# Patient Record
Sex: MALE | Race: WHITE | NOT HISPANIC OR LATINO | Employment: UNEMPLOYED | URBAN - METROPOLITAN AREA
[De-identification: names, ages, dates, MRNs, and addresses within clinical notes are randomized per-mention and may not be internally consistent; named-entity substitution may affect disease eponyms.]

---

## 2019-07-30 DIAGNOSIS — Z12.11 COLON CANCER SCREENING: ICD-10-CM

## 2021-03-03 DIAGNOSIS — Z23 NEED FOR VACCINATION: ICD-10-CM

## 2021-05-28 ENCOUNTER — HOSPITAL ENCOUNTER (OUTPATIENT)
Dept: PULMONOLOGY | Facility: MEDICAL CENTER | Age: 66
End: 2021-05-28
Attending: STUDENT IN AN ORGANIZED HEALTH CARE EDUCATION/TRAINING PROGRAM
Payer: MEDICARE

## 2021-05-28 PROCEDURE — 94729 DIFFUSING CAPACITY: CPT

## 2021-05-28 PROCEDURE — 94060 EVALUATION OF WHEEZING: CPT

## 2021-05-28 PROCEDURE — 94726 PLETHYSMOGRAPHY LUNG VOLUMES: CPT

## 2021-05-28 RX ORDER — ALBUTEROL SULFATE 90 UG/1
2 AEROSOL, METERED RESPIRATORY (INHALATION)
Status: DISCONTINUED | OUTPATIENT
Start: 2021-05-28 | End: 2021-05-29 | Stop reason: HOSPADM

## 2021-05-28 ASSESSMENT — PULMONARY FUNCTION TESTS
FEV1/FVC_PERCENT_PREDICTED: 72
FEV1/FVC_PREDICTED: 76.90
FEV1: 2.34
FEV1/FVC_PERCENT_CHANGE: 33
FEV1_LLN: 2.75
FEV1_PERCENT_CHANGE: 3
FVC_PERCENT_PREDICTED: 90
FEV1/FVC_PERCENT_PREDICTED: 76
FEV1_LLN: 2.76
FVC_PREDICTED: 4.29
FEV1/FVC: 59.59
FVC: 3.87
FEV1_PERCENT_PREDICTED: 68
FVC: 4.22
FEV1/FVC_PERCENT_LLN: 64.21
FVC_PERCENT_PREDICTED: 98
FVC_LLN: 3.58
FEV1_PERCENT_CHANGE: 9
FVC_LLN: 3.58
FEV1/FVC: 55.45
FEV1/FVC_PERCENT_CHANGE: -5
FEV1: .26
FEV1/FVC: 7
FEV1_PREDICTED: 3.29
FEV1/FVC_PERCENT_PREDICTED: 72
FEV1/FVC: 55.47
FEV1/FVC_PERCENT_PREDICTED: 77
FEV1/FVC_PERCENT_PREDICTED: 76
FEV1/FVC_PERCENT_LLN: 64.21
FEV1_PERCENT_PREDICTED: 71

## 2021-05-31 NOTE — PROCEDURES
DATE OF SERVICE:  05/28/2021     PULMONARY FUNCTION TEST INTERPRETATION     REQUESTING PROVIDER:  Lance Collazo MD     REASON FOR REQUEST:  COPD.     INTERPRETATION:  1.  Acceptable and reproducible.  2.  FEV1 2.26 liters (68%), FVC 3.87 liters (90%), FEV1/FVC 58.59%.  3.  Flow volume loops consistent with peripheral air obstruction.  4.  TLC 7.69 liters (112%).  5.  DLCO 20.17 mL per mmHg (17%).     IMPRESSION:  Moderate obstruction with no response to bronchodilator, evidence   of air trapping and mild reduction in gas transfer consistent with patient's   diagnosis of asthma.        ______________________________  MD ROSE Perkins/MARCO/NOHEMI    DD:  05/30/2021 16:48  DT:  05/30/2021 17:37    Job#:  [id number]    CC:Lance Collazo MD

## 2021-06-01 PROCEDURE — 94729 DIFFUSING CAPACITY: CPT | Mod: 26 | Performed by: INTERNAL MEDICINE

## 2021-06-01 PROCEDURE — 94726 PLETHYSMOGRAPHY LUNG VOLUMES: CPT | Mod: 26 | Performed by: INTERNAL MEDICINE

## 2021-06-01 PROCEDURE — 94060 EVALUATION OF WHEEZING: CPT | Mod: 26 | Performed by: INTERNAL MEDICINE

## 2021-10-06 ENCOUNTER — OFFICE VISIT (OUTPATIENT)
Dept: INTERNAL MEDICINE | Facility: OTHER | Age: 66
End: 2021-10-06
Payer: COMMERCIAL

## 2021-10-06 VITALS
DIASTOLIC BLOOD PRESSURE: 72 MMHG | HEART RATE: 94 BPM | OXYGEN SATURATION: 98 % | BODY MASS INDEX: 37.66 KG/M2 | WEIGHT: 255 LBS | SYSTOLIC BLOOD PRESSURE: 128 MMHG | TEMPERATURE: 100 F

## 2021-10-06 DIAGNOSIS — R94.39 ABNORMAL STRESS TEST: ICD-10-CM

## 2021-10-06 DIAGNOSIS — F17.200 SMOKING: ICD-10-CM

## 2021-10-06 DIAGNOSIS — I73.9 PERIPHERAL ARTERIAL DISEASE (HCC): ICD-10-CM

## 2021-10-06 PROBLEM — B18.2 HEPATITIS C, CHRONIC (HCC): Status: ACTIVE | Noted: 2020-02-10

## 2021-10-06 PROBLEM — N18.30 STAGE 3 CHRONIC KIDNEY DISEASE: Status: ACTIVE | Noted: 2021-02-12

## 2021-10-06 PROBLEM — E78.5 DYSLIPIDEMIA: Status: ACTIVE | Noted: 2020-01-15

## 2021-10-06 PROBLEM — M54.30 SCIATICA: Status: ACTIVE | Noted: 2019-07-16

## 2021-10-06 PROBLEM — G47.33 OBSTRUCTIVE SLEEP APNEA SYNDROME IN ADULT: Status: ACTIVE | Noted: 2020-01-28

## 2021-10-06 PROBLEM — J44.9 CHRONIC OBSTRUCTIVE PULMONARY DISEASE (HCC): Status: ACTIVE | Noted: 2019-07-16

## 2021-10-06 PROBLEM — I73.00 RAYNAUDS SYNDROME: Status: ACTIVE | Noted: 2020-03-17

## 2021-10-06 PROBLEM — I50.9 HEART FAILURE (HCC): Status: ACTIVE | Noted: 2020-01-14

## 2021-10-06 PROCEDURE — 99214 OFFICE O/P EST MOD 30 MIN: CPT | Mod: GC | Performed by: INTERNAL MEDICINE

## 2021-10-06 RX ORDER — NICOTINE 21 MG/24HR
1 PATCH, TRANSDERMAL 24 HOURS TRANSDERMAL EVERY 24 HOURS
COMMUNITY
End: 2022-07-13 | Stop reason: SDUPTHER

## 2021-10-06 ASSESSMENT — LIFESTYLE VARIABLES: SUBSTANCE_ABUSE: 0

## 2021-10-06 ASSESSMENT — ENCOUNTER SYMPTOMS
HEADACHES: 0
FEVER: 0
PALPITATIONS: 0
SHORTNESS OF BREATH: 1
CHILLS: 0
BLURRED VISION: 0
CONSTIPATION: 0
ABDOMINAL PAIN: 0
WEAKNESS: 0
DEPRESSION: 0
COUGH: 1
DOUBLE VISION: 0
NAUSEA: 0
MYALGIAS: 0
VOMITING: 0
DIARRHEA: 0

## 2021-10-06 NOTE — PATIENT INSTRUCTIONS
- Get labs prior to next appt  - Take ASA daily and start Guaifenesin - would recommend taking as needed for 2 weeks but okay to continue for longer if needed  - F/u in 5 weeks for PAD

## 2021-10-06 NOTE — ASSESSMENT & PLAN NOTE
- Complains of cold sensation, numbness in BL feet  - When getting angiogram done, was told he had poor pulses in his feet  - Recommended to start ASA daily    - If no improvement in symptoms in next appt, can consider referral to Vascular.

## 2021-10-06 NOTE — PROGRESS NOTES
Established Patient    Pee Antoine is a 66  YOM with PMHx of gout, asthma, CKD III, HFpEF (50-55% 07/2020), HLD, HCV (s/p Mavyret completion) presenting today to the clinic for follow-up on his chronic issues    CC: F/u for labs    HPI:      He had a stress test done in July that showed some inconclusive results. As a result, he had an angiogram done last month - showed no significant occlusion that did not require stent placement. He is unsure of what meds he's on but knows he's on Spironolactone. He was told to get lab work done after that, along with a CXR. Lab work was done at [facility] - will get results.        He also complains of a cough for 8-9 months that has progressively getting worse. It happens both during day and night and is productive (yellow and green). The coughing at night is keeping him up. He was previously on Lisinopril but that has been discontinued since the angiogram.        He also complains of numbness and intermittent cold sensations in his feet. He was told in the past that he might have poor blood flow in his L leg.        He is currently on the patch for smoking cessation - has been on it since July. He admits to not using it everyday and occasional using cigarettes in between (when not on patch).       Review of Systems   Constitutional: Negative for chills and fever.   HENT: Negative for hearing loss and tinnitus.    Eyes: Negative for blurred vision and double vision.   Respiratory: Positive for cough (chronic, productive) and shortness of breath (chronic).    Cardiovascular: Positive for chest pain (occasionally) and leg swelling (occasionally). Negative for palpitations.   Gastrointestinal: Negative for abdominal pain, constipation, diarrhea, nausea and vomiting.   Musculoskeletal: Negative for joint pain and myalgias.   Neurological: Negative for weakness and headaches.   Psychiatric/Behavioral: Negative for depression and substance abuse.       Patient Active Problem  List    Diagnosis Date Noted   • Abnormal stress test 10/06/2021   • Peripheral arterial disease (HCC) 10/06/2021   • Stage 3 chronic kidney disease (HCC) 02/12/2021   • Raynauds syndrome 03/17/2020   • Hepatitis C, chronic (HCC) 02/10/2020   • Obstructive sleep apnea syndrome in adult 01/28/2020   • Dyslipidemia 01/15/2020   • Heart failure (HCC) 01/14/2020   • Smoking 01/14/2020   • Chronic obstructive pulmonary disease (HCC) 07/16/2019   • Sciatica 07/16/2019   • Renal cyst 05/22/2013   • Hepatitis C antibody test positive 05/22/2013   • Gout, chronic 03/18/2013   • Tobacco dependency 03/18/2013   • Circulatory disorder 03/18/2013   • Cough 03/18/2013   • Urinary hesitancy 03/18/2013       Social History     Tobacco Use   • Smoking status: Current Every Day Smoker   Substance Use Topics   • Alcohol use: Not on file   • Drug use: No       Current Outpatient Medications   Medication Sig Dispense Refill   • nicotine (NICODERM) 14 MG/24HR PATCH 24 HR Place 1 Patch on the skin every 24 hours.     • metoprolol tartrate (LOPRESSOR) 25 MG Tab Take 25 mg by mouth 2 times a day.     • mowitpu-mrhvvnjypa-lem (ROBITUSSIN CF) 30- MG/5ML Liquid Take 10 mL by mouth every four hours as needed for Cough for up to 30 days. 120 mL 1   • oxycodone, immediate release, (ROXICODONE) 5 MG TABS Take 1 Tab by mouth every 6 hours as needed for Mild Pain. 60 Each 0   • vitamin D, Ergocalciferol, (DRISDOL) 93711 UNITS CAPS capsule Take 1 Cap by mouth every 30 days. 1 Cap 0   • cyclobenzaprine (FLEXERIL) 10 MG TABS Take 1 Tab by mouth 3 times a day as needed for Mild Pain. 30 Tab 0   • gabapentin (NEURONTIN) 300 MG CAPS Take 1 Cap by mouth 3 times a day. 90 Cap 3   • allopurinol (ZYLOPRIM) 100 MG TABS Take 1 Tab by mouth every day. 30 Tab 3   • albuterol (VENTOLIN OR PROVENTIL) 108 (90 BASE) MCG/ACT AERS Inhale 2 Puffs by mouth every 6 hours as needed for Shortness of Breath. 8.5 g 3   • aspirin EC (ECOTRIN) 81 MG TBEC Take 1 Tab by  mouth every day. 30 Tab 6   • tiotropium (SPIRIVA) 18 MCG CAPS Inhale 1 Cap by mouth every day. 30 Cap 3     No current facility-administered medications for this visit.       /72   Pulse 94   Temp 37.8 °C (100 °F)   Wt 116 kg (255 lb)   SpO2 98%   BMI 37.66 kg/m²     PHYSICAL EXAM:  Physical Exam  Constitutional:       General: He is not in acute distress.     Appearance: Normal appearance. He is obese. He is not ill-appearing.   HENT:      Head: Normocephalic and atraumatic.      Mouth/Throat:      Mouth: Mucous membranes are moist.   Eyes:      Extraocular Movements: Extraocular movements intact.      Conjunctiva/sclera: Conjunctivae normal.   Cardiovascular:      Rate and Rhythm: Normal rate and regular rhythm.      Heart sounds: Normal heart sounds. No murmur heard.   No friction rub. No gallop.       Comments: Normal pulse in RLE, diminished pulse in LLE  Pulmonary:      Effort: Pulmonary effort is normal. No respiratory distress.      Breath sounds: Normal breath sounds. No wheezing or rales.   Musculoskeletal:      Right lower leg: No edema.      Left lower leg: No edema.   Skin:     Comments: Stasis dermatitis changes present   Neurological:      Mental Status: He is alert and oriented to person, place, and time.      Sensory: Sensory deficit (Decreased sensation in LLE - absent in lateral aspects) present.      Motor: No weakness.   Psychiatric:         Mood and Affect: Mood normal.         Behavior: Behavior normal.         Assessment and Plan  Abnormal stress test  - Had abnormal stress in July, for which he had an angiogram done last month  - Results of angio were normal - no stents were placed  - Currently on ASA, Metoprolol, and Spironolactone  - Was supposed to get f/u labs after procedure but has not got them done    - Encouraged to do so and will try to get records once available.     Peripheral arterial disease (HCC)  - Complains of cold sensation, numbness in BL feet  - When getting  angiogram done, was told he had poor pulses in his feet  - Recommended to start ASA daily    - If no improvement in symptoms in next appt, can consider referral to Vascular.     Smoking  - Trying to quit (50-pack year history) - using patches intermittently  - Educated on importance of not smoking while using patches  - Will try to use only patch for now  - Also complaining of cough, will try Guaifenesin short-term to help clear his lungs up    - Can try Tessalon pearls afterward          Return in about 5 weeks (around 11/10/2021) for Long.      Signed by: Almas Abebe M.D.

## 2021-10-06 NOTE — ASSESSMENT & PLAN NOTE
- Had abnormal stress in July, for which he had an angiogram done last month  - Results of angio were normal - no stents were placed  - Currently on ASA, Metoprolol, and Spironolactone  - Was supposed to get f/u labs after procedure but has not got them done    - Encouraged to do so and will try to get records once available.

## 2021-10-06 NOTE — ASSESSMENT & PLAN NOTE
- Trying to quit (50-pack year history) - using patches intermittently  - Educated on importance of not smoking while using patches  - Will try to use only patch now  - Also complaining of cough, will try Guaifenesin to help clear his lungs up

## 2021-11-10 ENCOUNTER — OFFICE VISIT (OUTPATIENT)
Dept: INTERNAL MEDICINE | Facility: OTHER | Age: 66
End: 2021-11-10
Payer: COMMERCIAL

## 2021-11-10 VITALS
HEART RATE: 66 BPM | BODY MASS INDEX: 38.01 KG/M2 | OXYGEN SATURATION: 94 % | TEMPERATURE: 97.6 F | DIASTOLIC BLOOD PRESSURE: 50 MMHG | SYSTOLIC BLOOD PRESSURE: 118 MMHG | WEIGHT: 257.4 LBS

## 2021-11-10 DIAGNOSIS — R12 HEARTBURN SYMPTOM: ICD-10-CM

## 2021-11-10 DIAGNOSIS — Z00.00 HEALTH CARE MAINTENANCE: ICD-10-CM

## 2021-11-10 DIAGNOSIS — I50.30 DIASTOLIC HEART FAILURE, UNSPECIFIED HF CHRONICITY (HCC): ICD-10-CM

## 2021-11-10 DIAGNOSIS — R63.5 ABNORMAL WEIGHT GAIN: ICD-10-CM

## 2021-11-10 DIAGNOSIS — I73.9 PERIPHERAL ARTERIAL DISEASE (HCC): ICD-10-CM

## 2021-11-10 DIAGNOSIS — R94.39 ABNORMAL STRESS TEST: ICD-10-CM

## 2021-11-10 PROBLEM — F17.200 SMOKING: Status: RESOLVED | Noted: 2020-01-14 | Resolved: 2021-11-10

## 2021-11-10 PROCEDURE — 99214 OFFICE O/P EST MOD 30 MIN: CPT | Mod: GC | Performed by: STUDENT IN AN ORGANIZED HEALTH CARE EDUCATION/TRAINING PROGRAM

## 2021-11-10 RX ORDER — LISINOPRIL 2.5 MG/1
2.5 TABLET ORAL
COMMUNITY
Start: 2021-08-23

## 2021-11-10 RX ORDER — OMEPRAZOLE 20 MG/1
20 CAPSULE, DELAYED RELEASE ORAL DAILY
Qty: 90 CAPSULE | Refills: 1 | Status: SHIPPED | OUTPATIENT
Start: 2021-11-10 | End: 2022-02-22

## 2021-11-10 RX ORDER — ATORVASTATIN CALCIUM 80 MG/1
80 TABLET, FILM COATED ORAL DAILY
COMMUNITY
Start: 2021-10-28 | End: 2022-02-04 | Stop reason: SDUPTHER

## 2021-11-10 RX ORDER — FUROSEMIDE 20 MG/1
20 TABLET ORAL DAILY
COMMUNITY
Start: 2021-09-08 | End: 2023-05-16 | Stop reason: SDUPTHER

## 2021-11-10 RX ORDER — SPIRONOLACTONE 25 MG/1
25 TABLET ORAL DAILY
COMMUNITY
Start: 2021-10-22 | End: 2022-08-04 | Stop reason: SDUPTHER

## 2021-11-10 RX ORDER — DIGOXIN 125 MCG
TABLET ORAL
COMMUNITY
Start: 2021-10-16 | End: 2021-11-10

## 2021-11-10 ASSESSMENT — ENCOUNTER SYMPTOMS
DEPRESSION: 0
NAUSEA: 1
SHORTNESS OF BREATH: 1
FEVER: 0
CONSTIPATION: 0
ABDOMINAL PAIN: 1
VOMITING: 1
HEADACHES: 0
WEAKNESS: 0
COUGH: 1
MYALGIAS: 0
BLURRED VISION: 0
DOUBLE VISION: 0
CHILLS: 0
PALPITATIONS: 0
DIARRHEA: 0

## 2021-11-10 ASSESSMENT — LIFESTYLE VARIABLES: SUBSTANCE_ABUSE: 0

## 2021-11-10 ASSESSMENT — PATIENT HEALTH QUESTIONNAIRE - PHQ9: CLINICAL INTERPRETATION OF PHQ2 SCORE: 0

## 2021-11-10 NOTE — ASSESSMENT & PLAN NOTE
- Follows with [facility]  - Per pt, on Lisinopril, Metoprolol, and Spironolactone    - States that Digoxin was d/c  - Will obtain outside records to verify

## 2021-11-10 NOTE — ASSESSMENT & PLAN NOTE
- PE relatively unchanged from last visit - still has venous stasis changes with decreased sensation and pulses in LLE  - Started ASA daily on last visit - noted no improvement  - Will refer to Vascular surgery

## 2021-11-10 NOTE — ASSESSMENT & PLAN NOTE
- Weight stable since last visit, but gained 30 lbs from May to August  - Thinks 2/2 inactivity and poor diet  - Encouraged healthy diet changes - included more fibers and vegetables  - Also encouraged to walk around 20-30 minutes per day

## 2021-11-10 NOTE — ASSESSMENT & PLAN NOTE
- Having heartburn-like symptoms: burning in chest after eating, nausea, vomiting, abdominal discomfort  - Symptoms on-and-off for more than 2 months  - Will try Omeprazole

## 2021-11-10 NOTE — PROGRESS NOTES
"Established Patient    Pee Antoine is a 66  YOM with PMHx of gout, asthma, CKD III, HFpEF (50-55% 07/2020), HLD, HCV (s/p Mavyret completion) presenting today to the clinic for follow-up on his chronic issues    CC: Follow-up    HPI:     Pt was seen 5 weeks ago after having an abnormal stress test in July 2021 and a normal angiogram. He was asked to get f/u labs after his angiogram - he had not done them prior to his last visit. He followed up with Cardiology, who said he did not need the labs anymore. He denies any chest pain or palpitations.       We also discussed about decreased sensation in his LLE - he was started on ASA during his previous visit. Today, he reports the pain is still there - he denies any improvement with the ASA. He has noted that gummies have helped with the pain though.        He also reports abdominal pain that is intermittent, occurs up to 4x a week. He describes it as sharp and causing nausea with occasional vomiting. He also describes it as \"heartburn\" and has it noticed when eating certain kinds of food.        Of note, he admits he had a 30 lb weight gain between May and August but has since remained stable. He has been changing his diet since then.      He is not interested in the flu shot today. He is also overdue for colonoscopy.    Review of Systems   Constitutional: Negative for chills and fever.   HENT: Negative for hearing loss and tinnitus.    Eyes: Negative for blurred vision and double vision.   Respiratory: Positive for cough (chronic, productive) and shortness of breath (chronic).    Cardiovascular: Positive for leg swelling (occasionally). Negative for chest pain and palpitations.   Gastrointestinal: Positive for abdominal pain (couple times a week), nausea and vomiting. Negative for constipation and diarrhea.   Musculoskeletal: Negative for joint pain and myalgias.   Neurological: Negative for weakness and headaches.   Psychiatric/Behavioral: Negative for depression " and substance abuse.       Patient Active Problem List    Diagnosis Date Noted   • Heartburn symptom 11/10/2021   • Abnormal weight gain 11/10/2021   • Abnormal stress test 10/06/2021   • Peripheral arterial disease (HCC) 10/06/2021   • Stage 3 chronic kidney disease (HCC) 02/12/2021   • Raynauds syndrome 03/17/2020   • Hepatitis C, chronic (HCC) 02/10/2020   • Obstructive sleep apnea syndrome in adult 01/28/2020   • Dyslipidemia 01/15/2020   • Heart failure (HCC) 01/14/2020   • Chronic obstructive pulmonary disease (HCC) 07/16/2019   • Sciatica 07/16/2019   • Hepatitis C antibody test positive 05/22/2013   • Gout, chronic 03/18/2013   • Tobacco dependency 03/18/2013       Social History     Tobacco Use   • Smoking status: Current Every Day Smoker   • Smokeless tobacco: Not on file   Substance Use Topics   • Alcohol use: Not on file   • Drug use: No       Current Outpatient Medications   Medication Sig Dispense Refill   • atorvastatin (LIPITOR) 80 MG tablet Take 80 mg by mouth every day.     • furosemide (LASIX) 20 MG Tab Take 20 mg by mouth every day.     • lisinopril (PRINIVIL) 2.5 MG Tab Take 2.5 mg by mouth every day.     • spironolactone (ALDACTONE) 25 MG Tab Take 25 mg by mouth every day.     • omeprazole (PRILOSEC) 20 MG delayed-release capsule Take 1 Capsule by mouth every day. 90 Capsule 1   • metoprolol tartrate (LOPRESSOR) 25 MG Tab Take 1 Tablet by mouth 2 times a day. 60 Tablet 5   • nicotine (NICODERM) 14 MG/24HR PATCH 24 HR Place 1 Patch on the skin every 24 hours.     • allopurinol (ZYLOPRIM) 100 MG TABS Take 1 Tab by mouth every day. 30 Tab 3   • albuterol (VENTOLIN OR PROVENTIL) 108 (90 BASE) MCG/ACT AERS Inhale 2 Puffs by mouth every 6 hours as needed for Shortness of Breath. 8.5 g 3   • aspirin EC (ECOTRIN) 81 MG TBEC Take 1 Tab by mouth every day. 30 Tab 6   • tiotropium (SPIRIVA) 18 MCG CAPS Inhale 1 Cap by mouth every day. 30 Cap 3     No current facility-administered medications for this  visit.       /50 (BP Location: Left arm, Patient Position: Sitting, BP Cuff Size: Adult)   Pulse 66   Temp 36.4 °C (97.6 °F) (Temporal)   Wt 117 kg (257 lb 6.4 oz)   SpO2 94%   BMI 38.01 kg/m²     PHYSICAL EXAM:  Physical Exam  Constitutional:       General: He is not in acute distress.     Appearance: Normal appearance. He is obese. He is not ill-appearing.   HENT:      Head: Normocephalic and atraumatic.      Mouth/Throat:      Mouth: Mucous membranes are moist.   Eyes:      Extraocular Movements: Extraocular movements intact.      Conjunctiva/sclera: Conjunctivae normal.   Cardiovascular:      Rate and Rhythm: Normal rate and regular rhythm.      Heart sounds: Normal heart sounds. No murmur heard.  No friction rub. No gallop.       Comments: Normal pulse in RLE, diminished pulse in LLE  Pulmonary:      Effort: Pulmonary effort is normal. No respiratory distress.      Breath sounds: Normal breath sounds. No wheezing or rales.   Musculoskeletal:      Right lower leg: No edema.      Left lower leg: No edema.   Skin:     Comments: Stasis dermatitis changes present   Neurological:      Mental Status: He is alert and oriented to person, place, and time.      Sensory: Sensory deficit (Decreased sensation in LLE - absent in lateral aspects) present.      Motor: No weakness.   Psychiatric:         Mood and Affect: Mood normal.         Behavior: Behavior normal.       Assessment and Plan  Peripheral arterial disease (HCC)  - PE relatively unchanged from last visit - still has venous stasis changes with decreased sensation and pulses in LLE  - Started ASA daily on last visit - noted no improvement  - Will refer to Vascular surgery    Heartburn symptom  - Having heartburn-like symptoms: burning in chest after eating, nausea, vomiting, abdominal discomfort  - Symptoms on-and-off for more than 2 months  - Will try Omeprazole    Abnormal weight gain  - Weight stable since last visit, but gained 30 lbs from May to  August  - Thinks 2/2 inactivity and poor diet  - Encouraged healthy diet changes - included more fibers and vegetables  - Also encouraged to walk around 20-30 minutes per day    Heart failure (HCC)  - Follows with [facility]  - Per pt, on Lisinopril, Metoprolol, and Spironolactone    - States that Digoxin was d/c  - Will obtain outside records to verify      Return in about 3 months (around 2/10/2022).      Signed by: Almas Abebe M.D.

## 2021-11-15 ENCOUNTER — TELEPHONE (OUTPATIENT)
Dept: VASCULAR LAB | Facility: MEDICAL CENTER | Age: 66
End: 2021-11-15

## 2021-11-15 NOTE — TELEPHONE ENCOUNTER
LM for pt to call back to schedule appt with Dr. Butler. (non urgent)  ----- Message from Michael J Bloch, M.D. sent at 11/11/2021  6:28 PM PST -----  Regarding: Vascular medicine referral  Please schedule initial visit Luke when available

## 2022-01-24 ENCOUNTER — OFFICE VISIT (OUTPATIENT)
Dept: VASCULAR LAB | Facility: MEDICAL CENTER | Age: 67
End: 2022-01-24
Attending: FAMILY MEDICINE
Payer: COMMERCIAL

## 2022-01-24 VITALS
DIASTOLIC BLOOD PRESSURE: 77 MMHG | HEART RATE: 93 BPM | HEIGHT: 69 IN | WEIGHT: 267 LBS | BODY MASS INDEX: 39.55 KG/M2 | SYSTOLIC BLOOD PRESSURE: 119 MMHG

## 2022-01-24 DIAGNOSIS — N18.31 STAGE 3A CHRONIC KIDNEY DISEASE: ICD-10-CM

## 2022-01-24 DIAGNOSIS — F17.200 TOBACCO DEPENDENCY: ICD-10-CM

## 2022-01-24 DIAGNOSIS — I50.30 DIASTOLIC HEART FAILURE, UNSPECIFIED HF CHRONICITY (HCC): ICD-10-CM

## 2022-01-24 DIAGNOSIS — G47.33 OBSTRUCTIVE SLEEP APNEA SYNDROME IN ADULT: ICD-10-CM

## 2022-01-24 DIAGNOSIS — J44.9 CHRONIC OBSTRUCTIVE PULMONARY DISEASE, UNSPECIFIED COPD TYPE (HCC): ICD-10-CM

## 2022-01-24 DIAGNOSIS — G62.9 NEUROPATHY: ICD-10-CM

## 2022-01-24 DIAGNOSIS — I87.2 CHRONIC VENOUS INSUFFICIENCY: ICD-10-CM

## 2022-01-24 DIAGNOSIS — I73.9 CLAUDICATION (HCC): ICD-10-CM

## 2022-01-24 DIAGNOSIS — E78.5 DYSLIPIDEMIA: ICD-10-CM

## 2022-01-24 PROCEDURE — 99212 OFFICE O/P EST SF 10 MIN: CPT

## 2022-01-24 PROCEDURE — 99214 OFFICE O/P EST MOD 30 MIN: CPT | Performed by: FAMILY MEDICINE

## 2022-01-24 RX ORDER — DIGOXIN 125 MCG
TABLET ORAL
COMMUNITY
Start: 2022-01-11 | End: 2022-01-24

## 2022-01-24 ASSESSMENT — ENCOUNTER SYMPTOMS
DIZZINESS: 0
DIARRHEA: 0
FOCAL WEAKNESS: 0
VOMITING: 0
HEMOPTYSIS: 0
WEAKNESS: 0
ABDOMINAL PAIN: 0
BACK PAIN: 1
CHILLS: 0
CLAUDICATION: 0
SEIZURES: 0
NAUSEA: 0
BRUISES/BLEEDS EASILY: 0
ORTHOPNEA: 0
TREMORS: 0
TINGLING: 1
MYALGIAS: 0
COUGH: 0
HEADACHES: 0
SENSORY CHANGE: 1
PALPITATIONS: 0
SHORTNESS OF BREATH: 0
FEVER: 0
WHEEZING: 0
BLOOD IN STOOL: 0

## 2022-01-24 NOTE — PROGRESS NOTES
"INITIAL VASCULAR MEDICINE VISIT  Subjective:   Pee Antoine is a 66 y.o. male  who presents today 22  for   Chief Complaint   Patient presents with   • Follow-Up     initially referred by Almas Abebe M.D for eval and med mgmt of PAD      Subjective      Lower extremity PAD:  Symptoms: reports bilat feet pins/needles, reduced sensation overall.   Chronic limb ischemic sx: denies rest pain, nonhealing leg wounds. Denies cold extremities and gets intermittent hot feelings.    Location: BLE distal legs   Timing: constant and worse at night affecting sleep   Claudication onset distance/pain free walking distance: reports mostly \"winded\" after about 20yds, no overt cramping or calf/thigh area pain   Total walking distance prior to stopping due to symptoms: 20yds   How long until pain subsides with rest: 10min to recover   Prior imaging studies:  BLE venous duplex    Prior back injury: yes,   Prior medications/interventions:    Meds: ASA, ACEI, statin - due to HF    Interventions: none    Exercise therapy: none   Tobacco hx:  reports that he has been smoking. He has never used smokeless tobacco.  Hx of ASCVD or VTE: chronic DVT - LLE, per duplex , known CVI     HTN:  No current concerns, stable, tolerating meds. Rx per cardiology   Home BP lo/70s   Lifestyle factors:   Weight Change since last visit: stable   BMI Readings from Last 5 Encounters:   22 39.43 kg/m²   11/10/21 38.01 kg/m²   10/06/21 37.66 kg/m²   13 35.44 kg/m²   13 35.15 kg/m²     Diet pattern changes since last visit: common adult  Daily salt intake estimate:  Low     EtOH: Yes, Details: social  Exercise habits: no regular exercise program  Perceived barriers to care: COPD, NAPOLES relating to COPD and HF.  Chronic back pain     HF:  Seeing [facility] cardiology , denies fluid overload, tolerating meds.   No echo available     Hyperlipidemia:  Stable, no current concerns  Current treatment: lifestyle changes  and " High intensity atorva     Antiplatelet/anticoagulation: Yes, Details: ASA 81mg, no bleeding noted     Type 2 DM:No     CKD:  No     Sleeping disorder/BRIAN: Yes, Details: reports hx of BRIAN, had PSG testing 1 yr ago w/o f/u per [facility].  Never started on CPAP      Hypothyroidism: No     History reviewed. No pertinent past medical history.  History reviewed. No pertinent surgical history.     Current Outpatient Medications:   •  atorvastatin, 80 mg, Oral, DAILY, Taking  •  furosemide, 20 mg, Oral, DAILY, Taking  •  lisinopril, 2.5 mg, Oral, QDAY, Taking  •  spironolactone, 25 mg, Oral, DAILY, Taking  •  omeprazole, 20 mg, Oral, DAILY, Taking  •  metoprolol tartrate, 25 mg, Oral, BID, Taking  •  nicotine, 1 Patch, Transdermal, Q24HRS, Taking  •  allopurinol, 100 mg, Oral, DAILY, Taking  •  albuterol, 2 Puff, Inhalation, Q6HRS PRN, Taking  •  aspirin EC, 81 mg, Oral, DAILY, Taking  •  tiotropium, 18 mcg, Inhalation, DAILY, Taking     No Known Allergies     History reviewed. No pertinent family history.     Social History     Tobacco Use   • Smoking status: Current Every Day Smoker   • Smokeless tobacco: Never Used   Substance Use Topics   • Alcohol use: Not on file   • Drug use: No     Review of Systems   Constitutional: Negative for chills, fever and malaise/fatigue.   HENT: Negative for nosebleeds.    Respiratory: Negative for cough, hemoptysis, shortness of breath and wheezing.    Cardiovascular: Positive for leg swelling (mild bilat feet). Negative for chest pain, palpitations, orthopnea and claudication.   Gastrointestinal: Negative for abdominal pain, blood in stool, diarrhea, nausea and vomiting.   Musculoskeletal: Positive for back pain (chronic, s/p surgery). Negative for joint pain and myalgias.   Skin: Negative for itching and rash.   Neurological: Positive for tingling (bilat feet ) and sensory change (bilta feet ). Negative for dizziness, tremors, focal weakness, seizures, weakness and headaches.  "  Endo/Heme/Allergies: Does not bruise/bleed easily.         Objective      Objective:     Vitals:    01/24/22 1344   BP: 119/77   BP Location: Left arm   Patient Position: Sitting   BP Cuff Size: Large adult   Pulse: 93   Weight: 121 kg (267 lb)   Height: 1.753 m (5' 9\")      BP Readings from Last 5 Encounters:   01/24/22 119/77   11/10/21 118/50   10/06/21 128/72   09/19/13 120/80   08/07/13 142/86      Body mass index is 39.43 kg/m².  Physical Exam  Vitals reviewed.   Constitutional:       Appearance: Normal appearance.   HENT:      Head: Normocephalic and atraumatic.      Nose: Nose normal.      Mouth/Throat:      Mouth: Mucous membranes are moist.      Pharynx: Oropharynx is clear.   Eyes:      Extraocular Movements: Extraocular movements intact.      Conjunctiva/sclera: Conjunctivae normal.   Cardiovascular:      Rate and Rhythm: Normal rate and regular rhythm.      Pulses:           Carotid pulses are 2+ on the right side and 2+ on the left side.       Radial pulses are 2+ on the right side and 2+ on the left side.        Dorsalis pedis pulses are 1+ on the right side and 0 on the left side.        Posterior tibial pulses are 1+ on the right side and 0 on the left side.      Heart sounds: Normal heart sounds.      Comments:    Spider telangectasia:       RLE:  Diffuse distal      LLE: diffuse distal   Varicosities:           RLE: scattered     LLE: scattered   Corona phlebectatica:      RLE:  Mild       LLE:  Moderate    Cording:         RLE:  None     LLE: None   Pulmonary:      Effort: Pulmonary effort is normal.      Breath sounds: Normal breath sounds.   Musculoskeletal:         General: Normal range of motion.      Cervical back: Normal range of motion and neck supple.      Right lower leg: No edema.      Left lower leg: No edema.   Skin:     General: Skin is warm and dry.      Capillary Refill: Capillary refill takes less than 2 seconds.          Neurological:      General: No focal deficit present.     "  Mental Status: He is alert and oriented to person, place, and time. Mental status is at baseline.   Psychiatric:         Mood and Affect: Mood normal.         Behavior: Behavior normal.           No results found for: LIPOPROTA   No results found for: APOB                        No results found for: MICROALBCALC, MALBCRT, MALBEXCR, ZGDISE09, MICROALBUR, MICRALB, UMICROALBUM, MICROALBTIM     VASCULAR IMAGING:   Last EKG: No results found for this or any previous visit.     Medical Decision Making:  Today's Assessment / Status / Plan:     1. Chronic venous insufficiency  Comp Metabolic Panel    US-EXTREMITY VENOUS LOWER BILAT   2. Dyslipidemia  Lipid Profile   3. Claudication (HCC)  Comp Metabolic Panel    Lipid Profile    CBC WITHOUT DIFFERENTIAL    MICROALBUMIN CREAT RATIO URINE    URINALYSIS    US-EXTREMITY ARTERY LOWER BILAT W/LUDWIN (COMBO)    CRP HIGH SENSITIVE (CARDIAC)    Sed Rate   4. Diastolic heart failure, unspecified HF chronicity ([facility])  proBrain Natriuretic Peptide, NT   5. Obstructive sleep apnea syndrome in adult     6. Stage 3a chronic kidney disease (HCC)  MICROALBUMIN CREAT RATIO URINE    URINALYSIS    HEMOGLOBIN A1C (Glycohemoglobin GHB Total/A1C with MBG Estimate)   7. Chronic obstructive pulmonary disease, unspecified COPD type ([facility])     8. Tobacco dependency     9. Neuropathy  proBrain Natriuretic Peptide, NT    MPO AND PR3 WITH REFLEX TO ANCA    VITAMIN B6    SPEP W/REFLEX TO SHASHI, A, G, M    TSH WITH REFLEX TO FT4    VITAMIN B12    FOLATE    HEMOGLOBIN A1C (Glycohemoglobin GHB Total/A1C with MBG Estimate)     Patient Type: Secondary Prevention    Etiology of Established CVD if Present:     1) BLE leg/feet pain - probably multifactorial involving tobacco induced neuropathy and possibly small vessel disease along with CVI and back related disorders such as radiculopathy vs canal stenosis (neurogenic claudication)   - as explained to pt, no indications of high risk arterial disease or overt acute  "limb ischemia s/s   - check LUDWIN, VR duplex and labs   - likely will need further neuropathy evaluation and possibly back evaluation to eval for neurogenic claudication - would defer to PCP for referrals based upon our clinic's initial w/u and results   - continue current meds     2) CHRONIC VENOUS INSUFFICIENCY  LLE - possible postthrombotic syndrome, known chronic DVT 2013 duplex   CEAP classification: C4aS / Ep,s / As,d / Pr,o  - reviewed anatomy, pathophys and gave educ handouts regarding CVI, common s/s, possible complications, and tx options   Plan:  - check BLE venous reflux duplex to eval for possible options for interventions   - continue knee-high compression socks, 20-30mmHg, as much as tolerated, reviewed compressionstore.[employer] and sizing processes   - increase walking, avoid prolonged standing   - elevated legs while sitting and sleeping above heart level  - emphasized need for reduction of central adiposity to reduce extrinsic compression of the low-pressure IVC system to improve venous return and reduce distal venous pressure in legs - reviewed dietary changes and monitor weight and waist circumference  - side sleeping with body pillow may improve lymphatic and venous return by reducing  extrinsic compression on IVC during sleep  - reduce sodium (\"salt\") in diet to less than 2,000mg daily   - continue daily moisturizing lotion (such as Gold Bond Diabetic Foot Cream)  Medications:   - consider horse chestnut seed extract 300mg 2 times daily for 3 to 6 months to determine if helps with venous health - over the counter at most pharmacies or online   - we can consider vasculera - available by prescription only, review website [email or web address].com for more information and to determine if covered by insurance, cost about $120 per 90 days     BLOOD PRESSURE MANAGEMENT:  ACC/AHA (2017) goal <130/80  Home BP at goal:  yes  Office BP at goal:  yes  24h ABPM: not ordered to date  Indications of end organ damage: LVH by " Echo/EKG  Device candidate? no  Plan:   Monitoring:   - start/continue home BP monitoring, reviewed correct technique, provide BP log and instructions  - order 24h ABPM:  NO  - monitor lytes/gfr routinely   - contact office if BP consistently >140/>90 for discussion of tx adjustments   Medications:  ACEi/ARB: continue lisinopril 2.5mg daily (HF dosing)  DHP-CCB: none   Thiazide: none   Pa-receptor Antagonist: continue spironolactone 25mg daily    Other:   Peripheral Alpha Blocker: not indicated   Loop: continue furosemide 20mg daily   BB: continue metoprolol 25mg BID     LIPID MANAGEMENT:   Qualifies for Statin Therapy Based on 2018 ACC/AHA Guidelines: yes, Secondary prevention - <74yo, ASCVD not at very high risk  The ASCVD Risk score (Joshmeredith ZELAYA Jr, et al., 2013) failed to calculate.  Major ASCVD events: None   High-risk condition: None   Risk-enhancers: N/A  Currently on Statin: Yes  Treatment goals: reduce LDL-C 30-49% and LDL-C <100 (consider non-HDL-C <130, apoB <90)  At goal? no  Plan:   - reinforced ongoing TLC measures as noted   - monitor labs prior to next visit   Meds:   - continue atorva 80mg     ANTITHROMBOTIC THERAPY:  - continue ASA 81mg daily     GLYCEMIC STATUS: Normal    LIFESTYLE INTERVENTIONS:    SMOKING: action stage     reports that he has been smoking. He has never used smokeless tobacco.   Provided strong recommendation for complete cessation and informed this is the primary contributor to the majority of all ASCVD and cancer-related conditions and can result in significant morbidity and early mortality.   - reviewed resources for cessation including tobacco cessation clinic visit, pharmacotx meds, quit lines  - review at every visit   Provided 3 minutes of face-to-face counseling on above topics     PHYSICAL ACTIVITY: continue healthy activity to improve CV fitness.  In general, targeting >150min/week of moderate-level activity, but walking as much as tolerated will be acceptable considered  co-morbidities     WEIGHT MANAGEMENT AND NUTRITION: Dietary plan was discussed with patient at this visit including Mediterrean and/or DASH-dietary approaches    OTHER:    # COPD - high dyspnea score, defer mgmt to PCP and/or pulm     # gout - stable  - continue allopurinol     Instructed to follow-up with PCP for remainder of adult medical needs: yes  We will partner with other providers in the management of established vascular disease and cardiometabolic risk factors.    Studies to Be Obtained: VR duplex , LUDWIN   Labs to Be Obtained: as noted above     Follow up in: 6 weeks    Popeye Butler M.D.  [facility]   [facility]   [phone]

## 2022-02-04 DIAGNOSIS — R74.8 ELEVATED LIVER ENZYMES: ICD-10-CM

## 2022-02-04 DIAGNOSIS — M1A.0790 CHRONIC GOUT OF FOOT, UNSPECIFIED CAUSE, UNSPECIFIED LATERALITY: ICD-10-CM

## 2022-02-04 DIAGNOSIS — R76.8 HEPATITIS C ANTIBODY TEST POSITIVE: ICD-10-CM

## 2022-02-04 DIAGNOSIS — G62.9 NEUROPATHY: ICD-10-CM

## 2022-02-04 NOTE — TELEPHONE ENCOUNTER
Last seen: 11/10/21 by Dr. Abebe  Next appt: 2/22/22     Does patient have an active prescription for medications requested? No    Received Request Via: Patient

## 2022-02-05 RX ORDER — ATORVASTATIN CALCIUM 80 MG/1
80 TABLET, FILM COATED ORAL DAILY
Qty: 90 TABLET | Refills: 0 | Status: SHIPPED | OUTPATIENT
Start: 2022-02-05 | End: 2022-05-16 | Stop reason: SDUPTHER

## 2022-02-05 RX ORDER — ALLOPURINOL 100 MG/1
100 TABLET ORAL DAILY
Qty: 30 TABLET | Refills: 3 | Status: SHIPPED | OUTPATIENT
Start: 2022-02-05 | End: 2022-09-08

## 2022-02-22 ENCOUNTER — OFFICE VISIT (OUTPATIENT)
Dept: INTERNAL MEDICINE | Facility: OTHER | Age: 67
End: 2022-02-22
Payer: COMMERCIAL

## 2022-02-22 VITALS
BODY MASS INDEX: 40.08 KG/M2 | DIASTOLIC BLOOD PRESSURE: 77 MMHG | HEIGHT: 69 IN | SYSTOLIC BLOOD PRESSURE: 123 MMHG | TEMPERATURE: 98.9 F | OXYGEN SATURATION: 92 % | HEART RATE: 75 BPM | WEIGHT: 270.6 LBS

## 2022-02-22 DIAGNOSIS — I73.9 PERIPHERAL ARTERIAL DISEASE (HCC): ICD-10-CM

## 2022-02-22 DIAGNOSIS — E66.9 OBESITY (BMI 30-39.9): ICD-10-CM

## 2022-02-22 DIAGNOSIS — R09.82 POST-NASAL DRIP: ICD-10-CM

## 2022-02-22 PROCEDURE — 99214 OFFICE O/P EST MOD 30 MIN: CPT | Mod: GC | Performed by: STUDENT IN AN ORGANIZED HEALTH CARE EDUCATION/TRAINING PROGRAM

## 2022-02-22 RX ORDER — FLUTICASONE PROPIONATE 50 MCG
1 SPRAY, SUSPENSION (ML) NASAL DAILY
Qty: 16 G | Refills: 0 | Status: SHIPPED | OUTPATIENT
Start: 2022-02-22 | End: 2022-03-24

## 2022-02-22 RX ORDER — ALBUTEROL SULFATE 90 UG/1
1-2 AEROSOL, METERED RESPIRATORY (INHALATION) EVERY 4 HOURS PRN
Qty: 1 EACH | Refills: 1 | Status: SHIPPED | OUTPATIENT
Start: 2022-02-22 | End: 2022-04-28

## 2022-02-22 ASSESSMENT — ENCOUNTER SYMPTOMS
BLURRED VISION: 0
DOUBLE VISION: 0
COUGH: 1
WEAKNESS: 0
PALPITATIONS: 0
SHORTNESS OF BREATH: 1
ABDOMINAL PAIN: 0
VOMITING: 0
DEPRESSION: 0
DIARRHEA: 0
MYALGIAS: 0
NAUSEA: 0
FEVER: 0
HEADACHES: 0
CONSTIPATION: 0
CHILLS: 0

## 2022-02-22 ASSESSMENT — PATIENT HEALTH QUESTIONNAIRE - PHQ9: CLINICAL INTERPRETATION OF PHQ2 SCORE: 0

## 2022-02-22 ASSESSMENT — LIFESTYLE VARIABLES: SUBSTANCE_ABUSE: 0

## 2022-02-22 NOTE — PROGRESS NOTES
Established Patient    Pee Antoine is a 66  YOM with PMHx of gout, asthma, CKD III, HFpEF (50-55% 07/2020), HLD, HCV (s/p Mavyret completion) presenting today to the clinic for follow-up on his chronic issues    CC: 3 month follow-up    HPI:      Peripheral arterial disease - saw Vascular medicine last month, thought that symptoms likely related to chronic venous insufficiency but could also be related to radiculopathy vs canal stenosis. LUDWIN, VR duplex, and labs were ordered at that time. Pt has not yet done them at this time due to some insurance difficulties. He is unsure if tests are covered but plans to call them to figure it out.       Post-nasal drip - been issue for past couple of weeks, having cough as a result. Has not tried anything for it.      He is also getting his colonoscopy next week.       Review of Systems   Constitutional: Negative for chills and fever.   HENT: Negative for hearing loss and tinnitus.    Eyes: Negative for blurred vision and double vision.   Respiratory: Positive for cough (chronic) and shortness of breath (chronic).    Cardiovascular: Positive for leg swelling (occasionally). Negative for chest pain and palpitations.   Gastrointestinal: Negative for abdominal pain, constipation, diarrhea, nausea and vomiting.   Musculoskeletal: Negative for joint pain and myalgias.   Neurological: Negative for weakness and headaches.   Psychiatric/Behavioral: Negative for depression and substance abuse.       Patient Active Problem List    Diagnosis Date Noted   • BMI 39.0-39.9,adult 02/22/2022   • Post-nasal drip 02/22/2022   • Obesity (BMI 30-39.9) 02/22/2022   • Heartburn symptom 11/10/2021   • Abnormal weight gain 11/10/2021   • Abnormal stress test 10/06/2021   • Peripheral arterial disease (HCC) 10/06/2021   • Stage 3 chronic kidney disease (HCC) 02/12/2021   • Raynauds syndrome 03/17/2020   • Hepatitis C, chronic (HCC) 02/10/2020   • Obstructive sleep apnea syndrome in adult 01/28/2020  "  • Dyslipidemia 01/15/2020   • Heart failure (HCC) 01/14/2020   • Chronic obstructive pulmonary disease (HCC) 07/16/2019   • Sciatica 07/16/2019   • Hepatitis C antibody test positive 05/22/2013   • Gout, chronic 03/18/2013   • Tobacco dependency 03/18/2013       Social History     Tobacco Use   • Smoking status: Current Every Day Smoker   • Smokeless tobacco: Never Used   Substance Use Topics   • Drug use: No       Current Outpatient Medications   Medication Sig Dispense Refill   • albuterol 108 (90 Base) MCG/ACT Aero Soln inhalation aerosol Inhale 1-2 Puffs every four hours as needed for Shortness of Breath. 1 Each 1   • allopurinol (ZYLOPRIM) 100 MG Tab Take 1 Tablet by mouth every day. 30 Tablet 3   • atorvastatin (LIPITOR) 80 MG tablet Take 1 Tablet by mouth every day. 90 Tablet 0   • furosemide (LASIX) 20 MG Tab Take 20 mg by mouth every day.     • lisinopril (PRINIVIL) 2.5 MG Tab Take 2.5 mg by mouth every day.     • spironolactone (ALDACTONE) 25 MG Tab Take 25 mg by mouth every day.     • metoprolol tartrate (LOPRESSOR) 25 MG Tab Take 1 Tablet by mouth 2 times a day. 60 Tablet 5   • nicotine (NICODERM) 14 MG/24HR PATCH 24 HR Place 1 Patch on the skin every 24 hours.     • aspirin EC (ECOTRIN) 81 MG TBEC Take 1 Tab by mouth every day. 30 Tab 6   • tiotropium (SPIRIVA) 18 MCG CAPS Inhale 1 Cap by mouth every day. 30 Cap 3     No current facility-administered medications for this visit.       /77 (BP Location: Left arm, Patient Position: Sitting, BP Cuff Size: Large adult)   Pulse 75   Temp 37.2 °C (98.9 °F) (Temporal)   Ht 1.753 m (5' 9\")   Wt 123 kg (270 lb 9.6 oz)   SpO2 92%   BMI 39.96 kg/m²     PHYSICAL EXAM:  Physical Exam  Constitutional:       General: He is not in acute distress.     Appearance: Normal appearance. He is obese. He is not ill-appearing.   HENT:      Head: Normocephalic and atraumatic.      Mouth/Throat:      Mouth: Mucous membranes are moist.   Eyes:      Extraocular " Movements: Extraocular movements intact.      Conjunctiva/sclera: Conjunctivae normal.   Cardiovascular:      Rate and Rhythm: Normal rate and regular rhythm.      Heart sounds: Normal heart sounds. No murmur heard.    No friction rub. No gallop.      Comments: Normal pulse in RLE, diminished pulse in LLE  Pulmonary:      Effort: Pulmonary effort is normal. No respiratory distress.      Breath sounds: Normal breath sounds. No wheezing or rales.   Musculoskeletal:      Right lower leg: No edema.      Left lower leg: No edema.   Skin:     Findings: Erythema (BL hands, cold-related) present.      Comments: Stasis dermatitis changes present   Neurological:      Mental Status: He is alert and oriented to person, place, and time.      Sensory: Sensory deficit (Decreased sensation in LLE - absent in lateral aspects) present.      Motor: No weakness.   Psychiatric:         Mood and Affect: Mood normal.         Behavior: Behavior normal.           Assessment and Plan  BMI 39.0-39.9,adult  - Gained 13 lbs since last visit, after gaining 30 lbs over a couple of months prior to that  - Admits to minimal exercise but has been making diet changes    - Thinks exercise is limited by BLE pain  - Will try to follow-up with insurance to get lab tests to see if pain is related to PAD vs claudication vs radiculopathy    Peripheral arterial disease (HCC)  - Saw Vascular last month, labs ordered at time    - Not gotten them done yet due to insurance difficulties  - Will get them done once he figures that out  - Thought symptoms may be related to neurogenic claudication - will prescribe PT to see if any improvement in symptoms    Post-nasal drip  - Prescribed Flonase and encouraged pt to get Nettyp[employer] OTC      Return in about 6 months (around 8/22/2022).      Signed by: Almas Abebe M.D.

## 2022-02-22 NOTE — ASSESSMENT & PLAN NOTE
- Gained 13 lbs since last visit, after gaining 30 lbs over a couple of months prior to that  - Admits to minimal exercise but has been making diet changes    - Thinks exercise is limited by BLE pain  - Will try to follow-up with insurance to get lab tests to see if pain is related to PAD vs claudication vs radiculopathy

## 2022-02-22 NOTE — ASSESSMENT & PLAN NOTE
- Saw Vascular last month, labs ordered at time    - Not gotten them done yet due to insurance difficulties  - Will get them done once he figures that out  - Thought symptoms may be related to neurogenic claudication - will prescribe PT to see if any improvement in symptoms

## 2022-03-10 ENCOUNTER — TELEPHONE (OUTPATIENT)
Dept: VASCULAR LAB | Facility: MEDICAL CENTER | Age: 67
End: 2022-03-10
Payer: COMMERCIAL

## 2022-03-10 NOTE — TELEPHONE ENCOUNTER
Called patient and left message explaining the option for us to refer him to [facility]. Will await call back.    ----- Message from Michael J Bloch, M.D. sent at 3/9/2022  5:58 PM PST -----  Regarding: RE: Out of [employer]  Ulises - we can refer patient to [facility] if he is interested. LMK if so and I will place referral.     Bloch    ----- Message -----  From: Popeye Lindsey PharmD  Sent: 3/9/2022   4:45 PM PST  To: Popeye Butler M.D., #  Subject: RE: Out of [employer] Insurance                     Pt has a medicare advantage plan which means we are not in contract with his insurance.  Agree the pt would have to pay out of pocket for our services.  In the past we have referred to another vascular providers in the area (Anecdotally I've seen [facility] the most) and has been on a case by case basis.     Dr Bloch- any vascular providers you have been preferring for our medicare advantage patients?    Popeye CANTRELL    ----- Message -----  From: Popeye Butler M.D.  Sent: 3/9/2022   2:26 PM PST  To: Popeye Lindsey PharmD, #  Subject: RE: Out of [employer] Insurance                     Don't know if we have any assistance.  I've added Popeye Lindsey - marilin stein - to weigh in.      Thanks     ----- Message -----  From: Alex Snyder Ass't  Sent: 3/9/2022   1:42 PM PST  To: Popeye Butler M.D., Michael J Bloch, M.D.  Subject: Out of [employer] Insurance                         Hello,    This patient called today and asked to cancel his f/u with Dr. Butler on the 11th because he was told by his insurance and [facility] that his insurance is not in network and he would have to pay out of pocket to continue the visits in our clinic. This is a new situation for me so I wanted to ask, do we refer him elsewhere or is there a coordinator that helps with this? Please advise.

## 2022-03-11 ENCOUNTER — DOCUMENTATION (OUTPATIENT)
Dept: VASCULAR LAB | Facility: MEDICAL CENTER | Age: 67
End: 2022-03-11
Payer: COMMERCIAL

## 2022-03-11 ENCOUNTER — APPOINTMENT (OUTPATIENT)
Dept: VASCULAR LAB | Facility: MEDICAL CENTER | Age: 67
End: 2022-03-11
Attending: FAMILY MEDICINE
Payer: COMMERCIAL

## 2022-03-11 ASSESSMENT — ENCOUNTER SYMPTOMS
HEADACHES: 0
HEMOPTYSIS: 0
SHORTNESS OF BREATH: 0
WEAKNESS: 0
SENSORY CHANGE: 1
CLAUDICATION: 0
MYALGIAS: 0
TINGLING: 1
ORTHOPNEA: 0
FEVER: 0
SEIZURES: 0
DIZZINESS: 0
TREMORS: 0
PALPITATIONS: 0
COUGH: 0
BLOOD IN STOOL: 0
BACK PAIN: 1
NAUSEA: 0
VOMITING: 0
BRUISES/BLEEDS EASILY: 0
DIARRHEA: 0
WHEEZING: 0
CHILLS: 0
ABDOMINAL PAIN: 0
FOCAL WEAKNESS: 0

## 2022-03-11 NOTE — PROGRESS NOTES
"FOLLOW-UP VASCULAR MEDICINE VISIT  Subjective:   Pee Antoine is a  male  who presents 22  for   No chief complaint on file.    initially referred by Almas Abebe M.D for eval and med mgmt of PAD      Subjective      Lower extremity PAD:  Symptoms: reports bilat feet pins/needles, reduced sensation overall.   Chronic limb ischemic sx: denies rest pain, nonhealing leg wounds. Denies cold extremities and gets intermittent hot feelings.    Location: BLE distal legs   Timing: constant and worse at night affecting sleep   Claudication onset distance/pain free walking distance: reports mostly \"winded\" after about 20yds, no overt cramping or calf/thigh area pain   Total walking distance prior to stopping due to symptoms: 20yds   How long until pain subsides with rest: 10min to recover   Prior imaging studies:  BLE venous duplex    Prior back injury: yes,   Prior medications/interventions:    Meds: ASA, ACEI, statin - due to HF    Interventions: none    Exercise therapy: none   Tobacco hx:  reports that he has been smoking. He has never used smokeless tobacco.  Hx of ASCVD or VTE: chronic DVT - LLE, per duplex , known CVI     HTN:  No current concerns, stable, tolerating meds. Rx per cardiology   Home BP lo/70s   Lifestyle factors:   Weight Change since last visit: stable   BMI Readings from Last 5 Encounters:   22 39.96 kg/m²   22 39.43 kg/m²   11/10/21 38.01 kg/m²   10/06/21 37.66 kg/m²   13 35.44 kg/m²     Diet pattern changes since last visit: common adult  Daily salt intake estimate:  Low     EtOH: Yes, Details: social  Exercise habits: no regular exercise program  Perceived barriers to care: COPD, NAPOLES relating to COPD and HF.  Chronic back pain     HF:  Seeing [facility] cardiology , denies fluid overload, tolerating meds.   No echo available     Hyperlipidemia:  Stable, no current concerns  Current treatment: lifestyle changes  and High intensity atorva "     Antiplatelet/anticoagulation: Yes, Details: ASA 81mg, no bleeding noted      Sleeping disorder/BRIAN: Yes, Details: reports hx of BRIAN, had PSG testing 1 yr ago w/o f/u per [facility].  Never started on CPAP      Current Outpatient Medications:   •  albuterol, 1-2 Puff, Inhalation, Q4HRS PRN  •  fluticasone, 1 Spray, Nasal, DAILY  •  allopurinol, 100 mg, Oral, DAILY  •  atorvastatin, 80 mg, Oral, DAILY  •  furosemide, 20 mg, Oral, DAILY  •  lisinopril, 2.5 mg, Oral, QDAY  •  spironolactone, 25 mg, Oral, DAILY  •  metoprolol tartrate, 25 mg, Oral, BID  •  nicotine, 1 Patch, Transdermal, Q24HRS  •  aspirin EC, 81 mg, Oral, DAILY  •  tiotropium, 18 mcg, Inhalation, DAILY       Social History     Tobacco Use   • Smoking status: Current Every Day Smoker   • Smokeless tobacco: Never Used   Substance Use Topics   • Drug use: No     Review of Systems   Constitutional: Negative for chills, fever and malaise/fatigue.   HENT: Negative for nosebleeds.    Respiratory: Negative for cough, hemoptysis, shortness of breath and wheezing.    Cardiovascular: Positive for leg swelling (mild bilat feet). Negative for chest pain, palpitations, orthopnea and claudication.   Gastrointestinal: Negative for abdominal pain, blood in stool, diarrhea, nausea and vomiting.   Musculoskeletal: Positive for back pain (chronic, s/p surgery). Negative for joint pain and myalgias.   Skin: Negative for itching and rash.   Neurological: Positive for tingling (bilat feet ) and sensory change (bilta feet ). Negative for dizziness, tremors, focal weakness, seizures, weakness and headaches.   Endo/Heme/Allergies: Does not bruise/bleed easily.         Objective      Objective:     There were no vitals filed for this visit.   BP Readings from Last 5 Encounters:   02/22/22 123/77   01/24/22 119/77   11/10/21 118/50   10/06/21 128/72   09/19/13 120/80      There is no height or weight on file to calculate BMI.  Physical Exam  Vitals reviewed.   Constitutional:        Appearance: Normal appearance.   HENT:      Head: Normocephalic and atraumatic.      Nose: Nose normal.      Mouth/Throat:      Mouth: Mucous membranes are moist.      Pharynx: Oropharynx is clear.   Eyes:      Extraocular Movements: Extraocular movements intact.      Conjunctiva/sclera: Conjunctivae normal.   Cardiovascular:      Rate and Rhythm: Normal rate and regular rhythm.      Pulses:           Carotid pulses are 2+ on the right side and 2+ on the left side.       Radial pulses are 2+ on the right side and 2+ on the left side.        Dorsalis pedis pulses are 1+ on the right side and 0 on the left side.        Posterior tibial pulses are 1+ on the right side and 0 on the left side.      Heart sounds: Normal heart sounds.      Comments:    Spider telangectasia:       RLE:  Diffuse distal      LLE: diffuse distal   Varicosities:           RLE: scattered     LLE: scattered   Corona phlebectatica:      RLE:  Mild       LLE:  Moderate    Cording:         RLE:  None     LLE: None   Pulmonary:      Effort: Pulmonary effort is normal.      Breath sounds: Normal breath sounds.   Musculoskeletal:         General: Normal range of motion.      Cervical back: Normal range of motion and neck supple.      Right lower leg: No edema.      Left lower leg: No edema.   Skin:     General: Skin is warm and dry.      Capillary Refill: Capillary refill takes less than 2 seconds.          Neurological:      General: No focal deficit present.      Mental Status: He is alert and oriented to person, place, and time. Mental status is at baseline.   Psychiatric:         Mood and Affect: Mood normal.         Behavior: Behavior normal.           No results found for: LIPOPROTA   No results found for: APOB                        No results found for: MICROALBCALC, MALBCRT, MALBEXCR, BEJPIU28, MICROALBUR, MICRALB, UMICROALBUM, MICROALBTIM     VASCULAR IMAGING:   Last EKG: No results found for this or any previous visit.     Medical Decision  Making:  Today's Assessment / Status / Plan:     1. Chronic venous insufficiency     2. Dyslipidemia     3. Claudication (HCC)     4. Diastolic heart failure, unspecified HF chronicity (HCC)     5. Obstructive sleep apnea syndrome in adult     6. Stage 3a chronic kidney disease (HCC)     7. Chronic obstructive pulmonary disease, unspecified COPD type (HCC)     8. Tobacco dependency     9. Neuropathy       Patient Type: Secondary Prevention    Etiology of Established CVD if Present:     1) BLE leg/feet pain - probably multifactorial involving tobacco induced neuropathy and possibly small vessel disease along with CVI and back related disorders such as radiculopathy vs canal stenosis (neurogenic claudication)   - as explained to pt, no indications of high risk arterial disease or overt acute limb ischemia s/s   - check LUDWIN, VR duplex and labs   - likely will need further neuropathy evaluation and possibly back evaluation to eval for neurogenic claudication - would defer to PCP for referrals based upon our clinic's initial w/u and results   - continue current meds     2) CHRONIC VENOUS INSUFFICIENCY  LLE - possible postthrombotic syndrome, known chronic DVT 2013 duplex   CEAP classification: C4aS / Ep,s / As,d / Pr,o  - reviewed anatomy, pathophys and gave educ handouts regarding CVI, common s/s, possible complications, and tx options   Plan:  - check BLE venous reflux duplex to eval for possible options for interventions   - continue knee-high compression socks, 20-30mmHg, as much as tolerated, reviewed [email or web address] and sizing processes   - increase walking, avoid prolonged standing   - elevated legs while sitting and sleeping above heart level  - emphasized need for reduction of central adiposity to reduce extrinsic compression of the low-pressure IVC system to improve venous return and reduce distal venous pressure in legs - reviewed dietary changes and monitor weight and waist circumference  - side sleeping  "with body pillow may improve lymphatic and venous return by reducing  extrinsic compression on IVC during sleep  - reduce sodium (\"salt\") in diet to less than 2,000mg daily   - continue daily moisturizing lotion (such as Gold Bond Diabetic Foot Cream)  Medications:   - consider horse chestnut seed extract 300mg 2 times daily for 3 to 6 months to determine if helps with venous health - over the counter at most pharmacies or online   - we can consider vasculera - available by prescription only, review website [email or web address] for more information and to determine if covered by insurance, cost about $120 per 90 days     BLOOD PRESSURE MANAGEMENT:  ACC/AHA (2017) goal <130/80  Home BP at goal:  yes  Office BP at goal:  yes  24h ABPM: not ordered to date  Indications of end organ damage: LVH by Echo/EKG  Device candidate? no  Plan:   Monitoring:   - start/continue home BP monitoring, reviewed correct technique, provide BP log and instructions  - order 24h ABPM:  NO  - monitor lytes/gfr routinely   - contact office if BP consistently >140/>90 for discussion of tx adjustments   Medications:  ACEi/ARB: continue lisinopril 2.5mg daily (HF dosing)  DHP-CCB: none   Thiazide: none   Pa-receptor Antagonist: continue spironolactone 25mg daily    Other:   Peripheral Alpha Blocker: not indicated   Loop: continue furosemide 20mg daily   BB: continue metoprolol 25mg BID     LIPID MANAGEMENT:   Qualifies for Statin Therapy Based on 2018 ACC/AHA Guidelines: yes, Secondary prevention - <76yo, ASCVD not at very high risk  The ASCVD Risk score (Dresden DC Jr, et al., 2013) failed to calculate.  Major ASCVD events: None   High-risk condition: None   Risk-enhancers: N/A  Currently on Statin: Yes  Treatment goals: reduce LDL-C 30-49% and LDL-C <100 (consider non-HDL-C <130, apoB <90)  At goal? no  Plan:   - reinforced ongoing TLC measures as noted   - monitor labs prior to next visit   Meds:   - continue atorva 80mg     ANTITHROMBOTIC " THERAPY:  - continue ASA 81mg daily     GLYCEMIC STATUS: Normal    LIFESTYLE INTERVENTIONS:    SMOKING: action stage     reports that he has been smoking. He has never used smokeless tobacco.   Provided strong recommendation for complete cessation and informed this is the primary contributor to the majority of all ASCVD and cancer-related conditions and can result in significant morbidity and early mortality.   - reviewed resources for cessation including tobacco cessation clinic visit, pharmacotx meds, quit lines  - review at every visit   Provided 3 minutes of face-to-face counseling on above topics     PHYSICAL ACTIVITY: continue healthy activity to improve CV fitness.  In general, targeting >150min/week of moderate-level activity, but walking as much as tolerated will be acceptable considered co-morbidities     WEIGHT MANAGEMENT AND NUTRITION: Dietary plan was discussed with patient at this visit including Mediterrean and/or DASH-dietary approaches    OTHER:    # COPD - high dyspnea score, defer mgmt to PCP and/or pulm     # gout - stable  - continue allopurinol     Instructed to follow-up with PCP for remainder of adult medical needs: yes  We will partner with other providers in the management of established vascular disease and cardiometabolic risk factors.    Studies to Be Obtained: VR duplex , LUDWIN   Labs to Be Obtained: as noted above     Follow up in: 6 weeks    Popeye Butler M.D.  [facility]   [facility]   [phone]

## 2022-03-12 NOTE — PROGRESS NOTES
Insurance not covered, pt canceled appointment today.  Reports did not complete labs or imaging studies to date.  recommend ongoing care with his PCP.

## 2022-03-15 ENCOUNTER — TELEPHONE (OUTPATIENT)
Dept: VASCULAR LAB | Facility: MEDICAL CENTER | Age: 67
End: 2022-03-15
Payer: COMMERCIAL

## 2022-03-15 NOTE — TELEPHONE ENCOUNTER
Spoke with patient regarding his [employer] insurance. Dr. Butler had recommended his PCP managing his vascular care. Informed the patient of Dr. Butler's recommendation and he was okay with the idea. He stated he would wait to hear from his PCP and go forward from there.

## 2022-03-16 ENCOUNTER — APPOINTMENT (OUTPATIENT)
Dept: PHYSICAL THERAPY | Facility: REHABILITATION | Age: 67
End: 2022-03-16
Attending: STUDENT IN AN ORGANIZED HEALTH CARE EDUCATION/TRAINING PROGRAM
Payer: COMMERCIAL

## 2022-03-21 NOTE — TELEPHONE ENCOUNTER
Fluticasone Refill    Last seen: 2/22/22 by Dr. Abebe  Next appt: None    Was the patient seen in the last year in this department? Yes   Does patient have an active prescription for medications requested? No   Received Request Via: Pharmacy

## 2022-03-24 RX ORDER — FLUTICASONE PROPIONATE 50 MCG
1 SPRAY, SUSPENSION (ML) NASAL DAILY
Qty: 16 ML | Refills: 1 | Status: SHIPPED | OUTPATIENT
Start: 2022-03-24 | End: 2022-04-25

## 2022-04-25 RX ORDER — FLUTICASONE PROPIONATE 50 MCG
1 SPRAY, SUSPENSION (ML) NASAL DAILY
Qty: 16 ML | Refills: 1 | Status: SHIPPED | OUTPATIENT
Start: 2022-04-25 | End: 2022-06-17

## 2022-04-25 NOTE — TELEPHONE ENCOUNTER
Last seen: 02.22.2022 by Dr. Abebe  Next appt: None    Was the patient seen in the last year in this department? Yes   Does patient have an active prescription for medications requested? No   Received Request Via: Pharmacy

## 2022-04-28 RX ORDER — ALBUTEROL SULFATE 90 UG/1
1-2 AEROSOL, METERED RESPIRATORY (INHALATION) EVERY 4 HOURS PRN
Qty: 6.7 EACH | Refills: 1 | Status: SHIPPED | OUTPATIENT
Start: 2022-04-28 | End: 2022-11-14 | Stop reason: SDUPTHER

## 2022-04-28 NOTE — TELEPHONE ENCOUNTER
Albuterol Refill    Last seen: 2/22/22 by Dr. Abebe  Next appt: None  Was the patient seen in the last year in this department? Yes   Does patient have an active prescription for medications requested? No   Received Request Via: Pharmacy

## 2022-05-16 NOTE — TELEPHONE ENCOUNTER
Last seen: 2/22/22 by Dr. Abebe  Next appt: None      Does patient have an active prescription for medications requested? No    Received Request Via: Patient

## 2022-05-17 RX ORDER — ATORVASTATIN CALCIUM 80 MG/1
80 TABLET, FILM COATED ORAL DAILY
Qty: 90 TABLET | Refills: 1 | Status: SHIPPED | OUTPATIENT
Start: 2022-05-17 | End: 2022-08-04 | Stop reason: SDUPTHER

## 2022-05-19 ENCOUNTER — APPOINTMENT (OUTPATIENT)
Dept: PHYSICAL THERAPY | Facility: REHABILITATION | Age: 67
End: 2022-05-19
Attending: STUDENT IN AN ORGANIZED HEALTH CARE EDUCATION/TRAINING PROGRAM
Payer: COMMERCIAL

## 2022-05-23 ENCOUNTER — APPOINTMENT (OUTPATIENT)
Dept: PHYSICAL THERAPY | Facility: REHABILITATION | Age: 67
End: 2022-05-23
Attending: STUDENT IN AN ORGANIZED HEALTH CARE EDUCATION/TRAINING PROGRAM
Payer: COMMERCIAL

## 2022-05-27 ENCOUNTER — APPOINTMENT (OUTPATIENT)
Dept: PHYSICAL THERAPY | Facility: REHABILITATION | Age: 67
End: 2022-05-27
Attending: STUDENT IN AN ORGANIZED HEALTH CARE EDUCATION/TRAINING PROGRAM
Payer: COMMERCIAL

## 2022-06-01 ENCOUNTER — APPOINTMENT (OUTPATIENT)
Dept: PHYSICAL THERAPY | Facility: REHABILITATION | Age: 67
End: 2022-06-01
Attending: STUDENT IN AN ORGANIZED HEALTH CARE EDUCATION/TRAINING PROGRAM
Payer: COMMERCIAL

## 2022-06-03 ENCOUNTER — APPOINTMENT (OUTPATIENT)
Dept: PHYSICAL THERAPY | Facility: REHABILITATION | Age: 67
End: 2022-06-03
Attending: STUDENT IN AN ORGANIZED HEALTH CARE EDUCATION/TRAINING PROGRAM
Payer: COMMERCIAL

## 2022-06-06 ENCOUNTER — OFFICE VISIT (OUTPATIENT)
Dept: INTERNAL MEDICINE | Facility: OTHER | Age: 67
End: 2022-06-06
Payer: COMMERCIAL

## 2022-06-06 VITALS
BODY MASS INDEX: 40.29 KG/M2 | OXYGEN SATURATION: 92 % | TEMPERATURE: 99.1 F | HEIGHT: 69 IN | WEIGHT: 272 LBS | DIASTOLIC BLOOD PRESSURE: 60 MMHG | SYSTOLIC BLOOD PRESSURE: 126 MMHG | HEART RATE: 84 BPM

## 2022-06-06 DIAGNOSIS — M54.50 CHRONIC MIDLINE LOW BACK PAIN WITHOUT SCIATICA: ICD-10-CM

## 2022-06-06 DIAGNOSIS — G89.29 CHRONIC MIDLINE LOW BACK PAIN WITHOUT SCIATICA: ICD-10-CM

## 2022-06-06 PROCEDURE — G0439 PPPS, SUBSEQ VISIT: HCPCS | Mod: GE | Performed by: STUDENT IN AN ORGANIZED HEALTH CARE EDUCATION/TRAINING PROGRAM

## 2022-06-06 ASSESSMENT — PATIENT HEALTH QUESTIONNAIRE - PHQ9
SUM OF ALL RESPONSES TO PHQ QUESTIONS 1-9: 6
CLINICAL INTERPRETATION OF PHQ2 SCORE: 3
5. POOR APPETITE OR OVEREATING: 0 - NOT AT ALL

## 2022-06-06 ASSESSMENT — ACTIVITIES OF DAILY LIVING (ADL): BATHING_REQUIRES_ASSISTANCE: 0

## 2022-06-06 ASSESSMENT — ENCOUNTER SYMPTOMS: GENERAL WELL-BEING: FAIR

## 2022-06-06 NOTE — ASSESSMENT & PLAN NOTE
- Chronic back pain that is limiting his ability to exercise and walk around  - History of lumbar laminectomy  - Will place PT referral as well as lumbar X-ray to evaluate for structural source of pain

## 2022-06-06 NOTE — PROGRESS NOTES
Chief Complaint   Patient presents with   • Follow-Up     Annual Exam        HPI:  Pee Antoine is a 66 y.o. here for Medicare Annual Wellness Visit.      He has no acute complaints - however, he notes that back pain continues to remain an issue for him. As a result, he has not been very active and has been mostly laying on the couch for the past few weeks. He thinks he may be having some retention as well.        Of note, he was asked to get labs done in January - he has not gotten them done. Educated him on the importance of getting them done in order to follow-up on his cholesterol, kidney disease, and more. Pt agreed to getting them done later this week, if not today.     Patient Active Problem List    Diagnosis Date Noted   • Chronic midline low back pain without sciatica 06/06/2022   • BMI 39.0-39.9,adult 02/22/2022   • Post-nasal drip 02/22/2022   • Heartburn symptom 11/10/2021   • Abnormal weight gain 11/10/2021   • Abnormal stress test 10/06/2021   • Peripheral arterial disease (HCC) 10/06/2021   • Stage 3 chronic kidney disease (HCC) 02/12/2021   • Raynauds syndrome 03/17/2020   • Hepatitis C, chronic (HCC) 02/10/2020   • Obstructive sleep apnea syndrome in adult 01/28/2020   • Dyslipidemia 01/15/2020   • Heart failure (HCC) 01/14/2020   • Chronic obstructive pulmonary disease (HCC) 07/16/2019   • Sciatica 07/16/2019   • Gout, chronic 03/18/2013   • Tobacco dependency 03/18/2013       Current Outpatient Medications   Medication Sig Dispense Refill   • atorvastatin (LIPITOR) 80 MG tablet Take 1 Tablet by mouth every day. 90 Tablet 1   • albuterol 108 (90 Base) MCG/ACT Aero Soln inhalation aerosol INHALE 1-2 PUFFS EVERY FOUR HOURS AS NEEDED FOR SHORTNESS OF BREATH. 6.7 Each 1   • fluticasone (FLONASE) 50 MCG/ACT nasal spray ADMINISTER 1 SPRAY INTO AFFECTED NOSTRIL(S) EVERY DAY. 16 mL 1   • allopurinol (ZYLOPRIM) 100 MG Tab Take 1 Tablet by mouth every day. 30 Tablet 3   • furosemide (LASIX) 20 MG Tab  Take 20 mg by mouth every day.     • lisinopril (PRINIVIL) 2.5 MG Tab Take 2.5 mg by mouth every day.     • spironolactone (ALDACTONE) 25 MG Tab Take 25 mg by mouth every day.     • nicotine (NICODERM) 14 MG/24HR PATCH 24 HR Place 1 Patch on the skin every 24 hours.     • aspirin EC (ECOTRIN) 81 MG TBEC Take 1 Tab by mouth every day. 30 Tab 6   • tiotropium (SPIRIVA) 18 MCG CAPS Inhale 1 Cap by mouth every day. 30 Cap 3   • metoprolol tartrate (LOPRESSOR) 25 MG Tab Take 1 Tablet by mouth 2 times a day. (Patient not taking: Reported on 6/6/2022) 60 Tablet 5     No current facility-administered medications for this visit.          Current supplements as per medication list.     Allergies: Patient has no known allergies.    Current social contact/activities: none     He  reports that he has been smoking cigarettes. He started smoking about 50 years ago. He has a 12.50 pack-year smoking history. He has never used smokeless tobacco. He reports current alcohol use. He reports that he does not use drugs.  Ready to quit: Not Answered  Counseling given: Not Answered      DPA/Advanced Directive:  Patient does not have an Advanced Directive.  A packet and workshop information was given on Advanced Directives.    ROS:    Gait: Uses a cane  Ostomy: No  Other tubes: No  Amputations: No  Chronic oxygen use: No  Last eye exam: few years ago   Wears hearing aids: No   : Denies any urinary leakage during the last 6 months    Screening:    Depression Screening  Little interest or pleasure in doing things?  3 - nearly every day  Feeling down, depressed , or hopeless? 0 - not at all  Trouble falling or staying asleep, or sleeping too much?  1 - several days  Feeling tired or having little energy?  2 - more than half the days  Poor appetite or overeating?  0 - not at all  Feeling bad about yourself - or that you are a failure or have let yourself or your family down? 0 - not at all  Trouble concentrating on things, such as reading the  newspaper or watching television? 0 - not at all  Moving or speaking so slowly that other people could have noticed.  Or the opposite - being so fidgety or restless that you have been moving around a lot more than usual?  0 - not at all  Thoughts that you would be better off dead, or of hurting yourself?  0 - not at all  Patient Health Questionnaire Score: 6    If depressive symptoms identified deferred to follow up visit unless specifically addressed in assessment and plan.    Interpretation of PHQ-9 Total Score   Score Severity   1-4 No Depression   5-9 Mild Depression   10-14 Moderate Depression   15-19 Moderately Severe Depression   20-27 Severe Depression    Screening for Cognitive Impairment  Three Minute Recall (daughter, heaven, mountain) 3/3    William clock face with all 12 numbers and set the hands to show 10 past 11.  Yes    Cognitive concerns identified deferred for follow up unless specifically addressed in assessment and plan.    Fall Risk Assessment  Has the patient had two or more falls in the last year or any fall with injury in the last year?  No    Safety Assessment  Throw rugs on floor.  Yes  Handrails on all stairs.  Yes  Good lighting in all hallways.  Yes  Difficulty hearing.  Yes  Patient counseled about all safety risks that were identified.    Functional Assessment ADLs  Are there any barriers preventing you from cooking for yourself or meeting nutritional needs?  No.    Are there any barriers preventing you from driving safely or obtaining transportation?  No.    Are there any barriers preventing you from using a telephone or calling for help?  No.    Are there any barriers preventing you from shopping?  No.    Are there any barriers preventing you from taking care of your own finances?  No.    Are there any barriers preventing you from managing your medications?  No.    Are there any barriers preventing you from showering, bathing or dressing yourself? No.    Are you currently engaging in  any exercise or physical activity?  No.     What is your perception of your health? Fair.    Health Maintenance Summary          Overdue - Annual Wellness Visit (Once) Overdue - never done    No completion history exists for this topic.          Overdue - COVID-19 Vaccine (1) Overdue - never done    No completion history exists for this topic.          Overdue - IMM PNEUMOCOCCAL VACCINE: 65+ Years (1 - PCV) Overdue - never done    No completion history exists for this topic.          Overdue - IMM HEP B VACCINE (1 of 3 - Risk 3-dose series) Overdue - never done    No completion history exists for this topic.          Overdue - IMM DTaP/Tdap/Td Vaccine (1 - Tdap) Overdue - never done    No completion history exists for this topic.          Overdue - IMM ZOSTER VACCINES (1 of 2) Overdue - never done    No completion history exists for this topic.          Overdue - Annual Pulmonary Function Test / Spirometry (Yearly) Overdue since 5/28/2022 05/28/2021  PFT DICTATED RESULTS    05/01/2013  PFT DICTATED RESULTS          IMM INFLUENZA (Season Ended) Next due on 9/1/2022    No completion history exists for this topic.          COLORECTAL CANCER SCREENING (COLONOSCOPY - Every 10 Years) Tentatively due on 3/2/2032    03/02/2022  REFERRAL TO GI FOR COLONOSCOPY          IMM MENINGOCOCCAL VACCINE (MCV4) (Series Information) Aged Out    No completion history exists for this topic.                Patient Care Team:  Almas Abebe M.D. as PCP - General (Internal Medicine)      Social History     Tobacco Use   • Smoking status: Current Every Day Smoker     Packs/day: 0.25     Years: 50.00     Pack years: 12.50     Types: Cigarettes     Start date: 1972   • Smokeless tobacco: Never Used   Vaping Use   • Vaping Use: Never used   Substance Use Topics   • Alcohol use: Yes     Comment: occassional   • Drug use: No     Family History   Problem Relation Age of Onset   • Cancer Mother      He  has a past medical history of  "Arterial disease (HCC), Combined systolic and diastolic congestive heart failure (HCC), COPD (chronic obstructive pulmonary disease) (HCC), Gout, Kidney disease, and Liver disease.   Past Surgical History:   Procedure Laterality Date   • HERNIA REPAIR     • LUMBAR LAMINECTOMY DISKECTOMY         Exam:   /60 (BP Location: Left arm, Patient Position: Sitting, BP Cuff Size: Large adult)   Pulse 84   Temp 37.3 °C (99.1 °F) (Temporal)   Ht 1.753 m (5' 9\")   Wt 123 kg (272 lb)   SpO2 92%  Body mass index is 40.17 kg/m².    Hearing fair.    Dentition upper dentures  Alert, oriented in no acute distress.  Eye contact is good, speech goal directed, affect calm  Lungs: CTAB, no wheezes, crackles, rales  Heart: RRR, no MRG, normal S1, S2  Neuro: Nl sensation and strength  Gait: Back slightly leaning forward but normal gait  Extremities: Slightly swollen hands bilaterally  Skin: BL hands very erythematous (chronic)    Assessment and Plan. The following treatment and monitoring plan is recommended:    1. Chronic midline low back pain without sciatica    Pt has multiple labs that are pending - informed pt to get them done. These include CBC, lipid profile, CMP, microalbumin/Cr ratio, A1c, TSH, etc.    Services suggested: No services needed at this time  Health Care Screening: Age-appropriate preventive services recommended by USPTF and ACIP covered by Medicare were discussed today. Services ordered if indicated and agreed upon by the patient.  Referrals offered: Community-based lifestyle interventions to reduce health risks and promote self-management and wellness, fall prevention, nutrition, physical activity, tobacco-use cessation, weight loss, and mental health services as per orders if indicated.    Discussion today about general wellness and lifestyle habits:    · Prevent falls and reduce trip hazards; Cautioned about securing or removing rugs.  · Have a working fire alarm and carbon monoxide detector;   · Engage " in regular physical activity and social activities     Follow-up: Return in about 5 weeks (around 7/11/2022).

## 2022-06-08 ENCOUNTER — APPOINTMENT (OUTPATIENT)
Dept: PHYSICAL THERAPY | Facility: REHABILITATION | Age: 67
End: 2022-06-08
Attending: STUDENT IN AN ORGANIZED HEALTH CARE EDUCATION/TRAINING PROGRAM
Payer: COMMERCIAL

## 2022-06-10 ENCOUNTER — APPOINTMENT (OUTPATIENT)
Dept: PHYSICAL THERAPY | Facility: REHABILITATION | Age: 67
End: 2022-06-10
Attending: STUDENT IN AN ORGANIZED HEALTH CARE EDUCATION/TRAINING PROGRAM
Payer: COMMERCIAL

## 2022-06-15 ENCOUNTER — APPOINTMENT (OUTPATIENT)
Dept: PHYSICAL THERAPY | Facility: REHABILITATION | Age: 67
End: 2022-06-15
Attending: STUDENT IN AN ORGANIZED HEALTH CARE EDUCATION/TRAINING PROGRAM
Payer: COMMERCIAL

## 2022-06-17 NOTE — TELEPHONE ENCOUNTER
Last seen: 06/06/22 by Dr. Abebe  Next appt: 07/13/22 with Dr. Abebe    Was the patient seen in the last year in this department? Yes   Does patient have an active prescription for medications requested? No   Received Request Via: Pharmacy

## 2022-06-19 RX ORDER — FLUTICASONE PROPIONATE 50 MCG
1 SPRAY, SUSPENSION (ML) NASAL DAILY
Qty: 16 ML | Refills: 1 | Status: SHIPPED | OUTPATIENT
Start: 2022-06-19 | End: 2023-01-31

## 2022-07-13 ENCOUNTER — OFFICE VISIT (OUTPATIENT)
Dept: INTERNAL MEDICINE | Facility: OTHER | Age: 67
End: 2022-07-13
Payer: COMMERCIAL

## 2022-07-13 VITALS
SYSTOLIC BLOOD PRESSURE: 116 MMHG | BODY MASS INDEX: 40.43 KG/M2 | WEIGHT: 273 LBS | HEART RATE: 78 BPM | DIASTOLIC BLOOD PRESSURE: 71 MMHG | OXYGEN SATURATION: 92 % | HEIGHT: 69 IN | TEMPERATURE: 98.9 F

## 2022-07-13 DIAGNOSIS — I73.9 PERIPHERAL ARTERIAL DISEASE (HCC): ICD-10-CM

## 2022-07-13 DIAGNOSIS — G89.29 CHRONIC MIDLINE LOW BACK PAIN WITHOUT SCIATICA: ICD-10-CM

## 2022-07-13 DIAGNOSIS — M54.50 CHRONIC MIDLINE LOW BACK PAIN WITHOUT SCIATICA: ICD-10-CM

## 2022-07-13 PROCEDURE — 99213 OFFICE O/P EST LOW 20 MIN: CPT | Mod: GE | Performed by: STUDENT IN AN ORGANIZED HEALTH CARE EDUCATION/TRAINING PROGRAM

## 2022-07-13 RX ORDER — NICOTINE 21 MG/24HR
1 PATCH, TRANSDERMAL 24 HOURS TRANSDERMAL EVERY 24 HOURS
Qty: 28 PATCH | Refills: 5 | Status: SHIPPED | OUTPATIENT
Start: 2022-07-13

## 2022-07-13 ASSESSMENT — ENCOUNTER SYMPTOMS
MYALGIAS: 0
DOUBLE VISION: 0
DIZZINESS: 0
WEAKNESS: 0
BLURRED VISION: 0
VOMITING: 0
DIARRHEA: 0
DEPRESSION: 0
NERVOUS/ANXIOUS: 0
ABDOMINAL PAIN: 0
FEVER: 0
SHORTNESS OF BREATH: 1
NAUSEA: 0
COUGH: 1
PALPITATIONS: 0
HEADACHES: 0
CONSTIPATION: 0
CHILLS: 0

## 2022-07-13 NOTE — PATIENT INSTRUCTIONS
- Follow-up in 6 weeks to further evaluate source/etiology of back pain    - Follow-up in 12 weeks to further discuss tobacco cessation

## 2022-07-13 NOTE — ASSESSMENT & PLAN NOTE
- Discussed weight loss strategies, weight has been unchanged since last visit  - Encouraged pt to try walking 150 minutes/week   - Discussed diet changes - both patient and his wife have been doing well by switching to more fruits and vegetables, reducing red meat intake, and trying to avoid desserts  - May be contributing to back issue - f/u in 5-6 weeks to see if this is contributing to back issues

## 2022-07-13 NOTE — ASSESSMENT & PLAN NOTE
- Has not yet had this evaluated by physical therapy  - Reports that it limits his activity, may be 2/2 obesity  - No alarm symptoms noted: no fever, night sweats/chills, lack of bowel/bladder control, gross motor weakness, no trauma or midline tenderness on exam, no history of malignancy  - Will have patient follow-up in 6 weeks to further evaluate cause/etiology of back pain    - Reprinted out order for PT but asked pt to defer for now  - Also provided pt and wife booklet on exercises for back to help with strengthening

## 2022-07-13 NOTE — PROGRESS NOTES
Established Patient    Pee Antoine is a 66 y.o. male who presents today with the following:    CC: 5 week follow-up    HPI:      PAD - was seen 5 weeks ago for wellness visit. At that time, he had no acute complaints but discussed weight loss and importance of getting his labs done prior to this visit. However, pt has gotten his labs done - lipid panel and CBC were normal. CMP was also normal, except Cr was 1.83 - however, Cr was 1.76 in Februrary 2021.        Chronic midline low back pain - has not yet seen PT as he lost his referral. Would like for it to be reprinted.    Review of Systems   Constitutional: Negative for chills and fever.   HENT: Negative for hearing loss and tinnitus.    Eyes: Negative for blurred vision and double vision.   Respiratory: Positive for cough (chronic) and shortness of breath (chronic).    Cardiovascular: Positive for leg swelling (occasionally). Negative for chest pain and palpitations.   Gastrointestinal: Negative for abdominal pain, constipation, diarrhea, nausea and vomiting.   Musculoskeletal: Negative for joint pain and myalgias.   Neurological: Negative for dizziness, weakness and headaches.   Psychiatric/Behavioral: Negative for depression. The patient is not nervous/anxious.        Patient Active Problem List    Diagnosis Date Noted   • Chronic midline low back pain without sciatica 06/06/2022   • BMI 39.0-39.9,adult 02/22/2022   • Post-nasal drip 02/22/2022   • Heartburn symptom 11/10/2021   • Abnormal weight gain 11/10/2021   • Abnormal stress test 10/06/2021   • Peripheral arterial disease (HCC) 10/06/2021   • Stage 3 chronic kidney disease (HCC) 02/12/2021   • Raynauds syndrome 03/17/2020   • Hepatitis C, chronic (HCC) 02/10/2020   • Obstructive sleep apnea syndrome in adult 01/28/2020   • Dyslipidemia 01/15/2020   • Heart failure (HCC) 01/14/2020   • Chronic obstructive pulmonary disease (HCC) 07/16/2019   • Sciatica 07/16/2019   • Gout, chronic 03/18/2013   •  "Tobacco dependency 03/18/2013       Social History     Tobacco Use   • Smoking status: Current Every Day Smoker     Packs/day: 0.25     Years: 50.00     Pack years: 12.50     Types: Cigarettes     Start date: 1972   • Smokeless tobacco: Never Used   Vaping Use   • Vaping Use: Never used   Substance Use Topics   • Alcohol use: Yes     Comment: occassional   • Drug use: No       Current Outpatient Medications   Medication Sig Dispense Refill   • nicotine (NICODERM) 14 MG/24HR PATCH 24 HR Place 1 Patch on the skin every 24 hours. 28 Patch 5   • fluticasone (FLONASE) 50 MCG/ACT nasal spray ADMINISTER 1 SPRAY INTO AFFECTED NOSTRIL(S) EVERY DAY. 16 mL 1   • atorvastatin (LIPITOR) 80 MG tablet Take 1 Tablet by mouth every day. 90 Tablet 1   • albuterol 108 (90 Base) MCG/ACT Aero Soln inhalation aerosol INHALE 1-2 PUFFS EVERY FOUR HOURS AS NEEDED FOR SHORTNESS OF BREATH. 6.7 Each 1   • allopurinol (ZYLOPRIM) 100 MG Tab Take 1 Tablet by mouth every day. 30 Tablet 3   • furosemide (LASIX) 20 MG Tab Take 20 mg by mouth every day.     • lisinopril (PRINIVIL) 2.5 MG Tab Take 2.5 mg by mouth every day.     • spironolactone (ALDACTONE) 25 MG Tab Take 25 mg by mouth every day.     • aspirin EC (ECOTRIN) 81 MG TBEC Take 1 Tab by mouth every day. 30 Tab 6   • tiotropium (SPIRIVA) 18 MCG CAPS Inhale 1 Cap by mouth every day. 30 Cap 3   • metoprolol tartrate (LOPRESSOR) 25 MG Tab Take 1 Tablet by mouth 2 times a day. 60 Tablet 5     No current facility-administered medications for this visit.       /71 (BP Location: Right arm, Patient Position: Sitting, BP Cuff Size: Large adult)   Pulse 78   Temp 37.2 °C (98.9 °F) (Temporal)   Ht 1.753 m (5' 9\")   Wt 124 kg (273 lb)   SpO2 92%   BMI 40.32 kg/m²     PHYSICAL EXAM:  Physical Exam  Constitutional:       General: He is not in acute distress.     Appearance: Normal appearance. He is obese. He is not ill-appearing.   HENT:      Head: Normocephalic and atraumatic.      " Mouth/Throat:      Mouth: Mucous membranes are moist.   Eyes:      Extraocular Movements: Extraocular movements intact.      Conjunctiva/sclera: Conjunctivae normal.   Cardiovascular:      Rate and Rhythm: Normal rate and regular rhythm.      Heart sounds: Normal heart sounds. No murmur heard.    No friction rub. No gallop.      Comments: Normal pulse in RLE, diminished pulse in LLE  Pulmonary:      Effort: Pulmonary effort is normal. No respiratory distress.      Breath sounds: Normal breath sounds. No wheezing or rales.   Musculoskeletal:      Right lower leg: No edema.      Left lower leg: No edema.   Skin:     Findings: Erythema (BL hands, cold-related) present.   Neurological:      Mental Status: He is alert and oriented to person, place, and time.      Sensory: Sensory deficit (Decreased sensation in LLE - absent in lateral aspects, chronic in nature) present.      Motor: No weakness.   Psychiatric:         Mood and Affect: Mood normal.         Behavior: Behavior normal.           Assessment and Plan  Peripheral arterial disease (HCC)  - Had labs done recently, all of which have remained fairly stable since previous labwork over a year ago     - Autoimmune work-up ordered by previous Vascular physician was also normal  - Tried following up with Dr. Butler, but they no longer take his insurance  - Will place new referral for Vascular medicine    Chronic midline low back pain without sciatica  - Has not yet had this evaluated by physical therapy  - Reports that it limits his activity, may be 2/2 obesity  - No alarm symptoms noted: no fever, night sweats/chills, lack of bowel/bladder control, gross motor weakness, no trauma or midline tenderness on exam, no history of malignancy  - Will have patient follow-up in 6 weeks to further evaluate cause/etiology of back pain    - Reprinted out order for PT but asked pt to defer for now  - Also provided pt and wife booklet on exercises for back to help with  strengthening    BMI 39.0-39.9,adult  - Discussed weight loss strategies, weight has been unchanged since last visit  - Encouraged pt to try walking 150 minutes/week   - Discussed diet changes - both patient and his wife have been doing well by switching to more fruits and vegetables, reducing red meat intake, and trying to avoid desserts  - May be contributing to back issue - f/u in 5-6 weeks to see if this is contributing to back issues      Return in about 6 weeks (around 8/24/2022).    Signed by: Almas Abebe M.D.

## 2022-07-13 NOTE — ASSESSMENT & PLAN NOTE
- Had labs done recently, all of which have remained fairly stable since previous labwork over a year ago     - Autoimmune work-up ordered by previous Vascular physician was also normal  - Tried following up with Dr. Butler, but they no longer take his insurance  - Will place new referral for Vascular medicine

## 2022-07-13 NOTE — PROGRESS NOTES
Teaching Physician Attestation      Level of Participation    I discussed with the resident physician the patient's history, exam, assessment and plan in detail.  Topics listed in my addendum were the focus of the visit.  Healthcare maintenance was not addressed this visit unless listed as a topic in my addendum.  I agree with plan as written along with the following additions/modifications:      Morbid obesity  -counseled on diet/wt loss and physical activity (limited due to back pain).  Patient reports making many substantial changes including limiting red meat, increasing fruits, limiting 's simple carbohydrates, praised patient on his efforts  -cont atorvastatin 80 mg daily given history of peripheral artery disease    Back pain  -denies alarm symptoms per report, not fully assessed today.  We will make close visit to assess for formal diagnosis, in interim patient can trial home PT with instructions that any significant worsening of pain he is to stop and await full evaluation    eval CKD (stable per report), back pain at next visit, 2 weeks to 1 month.  Also needs close tobacco cessation follow up, I see nicotine patch refilled alhtough this was not discussed today, on review has not been addressed for over 6 months.  Called patient after visit and left vm to try to clarify amount of cigarrettes to ensure properly titrated, f/u closely for this.  Reviewed cord compression sx for back pain that would prompt immediate eval.

## 2022-07-15 ENCOUNTER — DOCUMENTATION (OUTPATIENT)
Dept: VASCULAR LAB | Facility: MEDICAL CENTER | Age: 67
End: 2022-07-15
Payer: COMMERCIAL

## 2022-07-19 RX ORDER — FLUTICASONE PROPIONATE 50 MCG
1 SPRAY, SUSPENSION (ML) NASAL DAILY
Qty: 16 ML | Refills: 1 | OUTPATIENT
Start: 2022-07-19

## 2022-07-19 NOTE — TELEPHONE ENCOUNTER
Fluticasone Refill    Last seen: 7/13/22 by Dr. Abebe  Next appt: 8/24/22 with Dr. Abebe    Was the patient seen in the last year in this department? Yes   Does patient have an active prescription for medications requested? No   Received Request Via: Pharmacy

## 2022-08-04 ENCOUNTER — PATIENT MESSAGE (OUTPATIENT)
Dept: INTERNAL MEDICINE | Facility: OTHER | Age: 67
End: 2022-08-04
Payer: COMMERCIAL

## 2022-08-05 RX ORDER — ATORVASTATIN CALCIUM 80 MG/1
80 TABLET, FILM COATED ORAL DAILY
Qty: 90 TABLET | Refills: 1 | Status: SHIPPED | OUTPATIENT
Start: 2022-08-05 | End: 2023-02-10 | Stop reason: SDUPTHER

## 2022-08-05 RX ORDER — SPIRONOLACTONE 25 MG/1
25 TABLET ORAL DAILY
Qty: 90 TABLET | Refills: 1 | Status: SHIPPED | OUTPATIENT
Start: 2022-08-05 | End: 2023-05-24

## 2022-08-24 ENCOUNTER — APPOINTMENT (OUTPATIENT)
Dept: INTERNAL MEDICINE | Facility: OTHER | Age: 67
End: 2022-08-24
Payer: COMMERCIAL

## 2022-09-08 DIAGNOSIS — R76.8 HEPATITIS C ANTIBODY TEST POSITIVE: ICD-10-CM

## 2022-09-08 DIAGNOSIS — M1A.0790 CHRONIC GOUT OF FOOT, UNSPECIFIED CAUSE, UNSPECIFIED LATERALITY: ICD-10-CM

## 2022-09-08 DIAGNOSIS — G62.9 NEUROPATHY: ICD-10-CM

## 2022-09-08 DIAGNOSIS — R74.8 ELEVATED LIVER ENZYMES: ICD-10-CM

## 2022-09-08 RX ORDER — ALLOPURINOL 100 MG/1
100 TABLET ORAL DAILY
Qty: 90 TABLET | Refills: 1 | Status: SHIPPED | OUTPATIENT
Start: 2022-09-08 | End: 2023-03-13

## 2022-09-08 NOTE — TELEPHONE ENCOUNTER
Last seen: 07.13.2022 by Dr. Abebe  Next appt: 10.05.2022 with Dr. Abebe    Was the patient seen in the last year in this department? Yes   Does patient have an active prescription for medications requested? No   Received Request Via: Pharmacy

## 2022-09-26 ENCOUNTER — APPOINTMENT (OUTPATIENT)
Dept: INTERNAL MEDICINE | Facility: OTHER | Age: 67
End: 2022-09-26
Payer: COMMERCIAL

## 2022-10-05 ENCOUNTER — OFFICE VISIT (OUTPATIENT)
Dept: INTERNAL MEDICINE | Facility: OTHER | Age: 67
End: 2022-10-05
Payer: COMMERCIAL

## 2022-10-05 VITALS
DIASTOLIC BLOOD PRESSURE: 74 MMHG | BODY MASS INDEX: 41.03 KG/M2 | HEART RATE: 93 BPM | SYSTOLIC BLOOD PRESSURE: 112 MMHG | TEMPERATURE: 98.8 F | WEIGHT: 277 LBS | HEIGHT: 69 IN | OXYGEN SATURATION: 94 %

## 2022-10-05 DIAGNOSIS — I73.9 PERIPHERAL ARTERIAL DISEASE (HCC): ICD-10-CM

## 2022-10-05 DIAGNOSIS — M54.50 CHRONIC MIDLINE LOW BACK PAIN WITHOUT SCIATICA: ICD-10-CM

## 2022-10-05 DIAGNOSIS — G89.29 CHRONIC MIDLINE LOW BACK PAIN WITHOUT SCIATICA: ICD-10-CM

## 2022-10-05 DIAGNOSIS — Z12.2 SCREENING FOR LUNG CANCER: ICD-10-CM

## 2022-10-05 DIAGNOSIS — Z87.891 PERSONAL HISTORY OF NICOTINE DEPENDENCE: ICD-10-CM

## 2022-10-05 PROCEDURE — 99213 OFFICE O/P EST LOW 20 MIN: CPT | Mod: GC | Performed by: STUDENT IN AN ORGANIZED HEALTH CARE EDUCATION/TRAINING PROGRAM

## 2022-10-05 ASSESSMENT — ENCOUNTER SYMPTOMS
HEADACHES: 0
DOUBLE VISION: 0
SHORTNESS OF BREATH: 0
ABDOMINAL PAIN: 0
WEAKNESS: 0
COUGH: 0
NERVOUS/ANXIOUS: 0
DEPRESSION: 0
PALPITATIONS: 0
CONSTIPATION: 0
BLURRED VISION: 0
MYALGIAS: 0
DIARRHEA: 0
FEVER: 0
NAUSEA: 0
CHILLS: 0
VOMITING: 0

## 2022-10-05 NOTE — PROGRESS NOTES
Established Patient    Pee Antoine is a 67 y.o. male who presents today with the following:    CC: Back pain follow-up    HPI:      Back pain - pt had surgery years ago for herniated disc. He feels that his muscles in his back are weak. He describes his back pain as achy. He rates the pain as 8/10 but denies any radiation. He has had sciatica in past but improved after surgery. Pt has gained weight since last visit and has not been exercising consistently but denies any diet changes - currently has BMI of 40.91. He notes that even sitting for too long can hurt his back. He attributes his inability to exercise due to severe foot pain, related to PAD - has yet to see Vascular.         Smoking cessation - currently smoking 6-10 cigarettes/day. Has not been using Nicotine patches due to insurance not paying for it; however, he and his wife have bought it on their own and plan to use it.     Review of Systems   Constitutional:  Negative for chills and fever.   HENT:  Negative for hearing loss and tinnitus.    Eyes:  Negative for blurred vision and double vision.   Respiratory:  Negative for cough and shortness of breath.    Cardiovascular:  Negative for chest pain, palpitations and leg swelling.   Gastrointestinal:  Negative for abdominal pain, constipation, diarrhea, nausea and vomiting.   Musculoskeletal:  Negative for joint pain and myalgias.   Neurological:  Negative for weakness and headaches.   Psychiatric/Behavioral:  Negative for depression. The patient is not nervous/anxious.      Patient Active Problem List    Diagnosis Date Noted    Personal history of nicotine dependence  10/05/2022    Chronic midline low back pain without sciatica 06/06/2022    BMI 40.0-44.9, adult (HCC) 02/22/2022    Post-nasal drip 02/22/2022    Heartburn symptom 11/10/2021    Abnormal weight gain 11/10/2021    Abnormal stress test 10/06/2021    Peripheral arterial disease (HCC) 10/06/2021    Stage 3 chronic kidney disease (HCC)  02/12/2021    Raynauds syndrome 03/17/2020    Hepatitis C, chronic (HCC) 02/10/2020    Obstructive sleep apnea syndrome in adult 01/28/2020    Dyslipidemia 01/15/2020    Heart failure (HCC) 01/14/2020    Chronic obstructive pulmonary disease (HCC) 07/16/2019    Sciatica 07/16/2019    Gout, chronic 03/18/2013       Social History     Tobacco Use    Smoking status: Every Day     Packs/day: 0.25     Years: 50.00     Pack years: 12.50     Types: Cigarettes     Start date: 1972    Smokeless tobacco: Never   Vaping Use    Vaping Use: Never used   Substance Use Topics    Alcohol use: Yes     Comment: occassional    Drug use: No       Current Outpatient Medications   Medication Sig Dispense Refill    allopurinol (ZYLOPRIM) 100 MG Tab TAKE 1 TABLET BY MOUTH EVERY DAY 90 Tablet 1    atorvastatin (LIPITOR) 80 MG tablet Take 1 Tablet by mouth every day. 90 Tablet 1    spironolactone (ALDACTONE) 25 MG Tab Take 1 Tablet by mouth every day. 90 Tablet 1    metoprolol tartrate (LOPRESSOR) 25 MG Tab Take 1 Tablet by mouth 2 times a day. 60 Tablet 5    nicotine (NICODERM) 14 MG/24HR PATCH 24 HR Place 1 Patch on the skin every 24 hours. 28 Patch 5    fluticasone (FLONASE) 50 MCG/ACT nasal spray ADMINISTER 1 SPRAY INTO AFFECTED NOSTRIL(S) EVERY DAY. 16 mL 1    albuterol 108 (90 Base) MCG/ACT Aero Soln inhalation aerosol INHALE 1-2 PUFFS EVERY FOUR HOURS AS NEEDED FOR SHORTNESS OF BREATH. 6.7 Each 1    furosemide (LASIX) 20 MG Tab Take 20 mg by mouth every day.      lisinopril (PRINIVIL) 2.5 MG Tab Take 2.5 mg by mouth every day.      aspirin EC (ECOTRIN) 81 MG TBEC Take 1 Tab by mouth every day. 30 Tab 6    tiotropium (SPIRIVA) 18 MCG CAPS Inhale 1 Cap by mouth every day. (Patient not taking: Reported on 10/5/2022) 30 Cap 3     No current facility-administered medications for this visit.       /74 (BP Location: Left arm, Patient Position: Sitting, BP Cuff Size: Adult)   Pulse 93   Temp 37.1 °C (98.8 °F) (Temporal)   Ht 1.753 m  "(5' 9\")   Wt (!) 126 kg (277 lb)   SpO2 94%   BMI 40.91 kg/m²     PHYSICAL EXAM:  Physical Exam  Constitutional:       General: He is not in acute distress.     Appearance: Normal appearance. He is obese. He is not ill-appearing.      Comments: Uses cane for ambulation   HENT:      Head: Normocephalic and atraumatic.      Mouth/Throat:      Mouth: Mucous membranes are moist.   Eyes:      Extraocular Movements: Extraocular movements intact.      Conjunctiva/sclera: Conjunctivae normal.   Cardiovascular:      Rate and Rhythm: Normal rate and regular rhythm.      Heart sounds: Normal heart sounds. No murmur heard.    No friction rub. No gallop.      Comments: Normal pulse in RLE, diminished pulse in LLE  Pulmonary:      Effort: Pulmonary effort is normal. No respiratory distress.      Breath sounds: Normal breath sounds. No wheezing or rales.   Abdominal:      General: Bowel sounds are normal. There is no distension.      Palpations: Abdomen is soft.      Tenderness: There is no abdominal tenderness.   Musculoskeletal:      Right lower leg: No edema.      Left lower leg: No edema.   Skin:     Findings: Erythema (BL hands) present.   Neurological:      General: No focal deficit present.      Mental Status: He is alert and oriented to person, place, and time. Mental status is at baseline.      Sensory: Sensory deficit (Decreased sensation in LLE - absent in lateral aspects, chronic in nature) present.      Motor: No weakness.   Psychiatric:         Mood and Affect: Mood normal.         Behavior: Behavior normal.         Assessment and Plan  Personal history of nicotine dependence   - Smoking 6-10 cigarettes/day, has not been using Nicotine patches (unable to buy before due to financial reasons)  - Start Nicotine patch and gradually taper off cigarettes  - F/u in 6 weeks    Chronic midline low back pain without sciatica  - No limited ROM but unable to stand for long periods of time  - Has not been able to exercise 2/2 " significant PAD  - Has had sciatica in past but reports that this feels different than that     - At that time, it was treated with surgery  - May be related to lack of muscle use 2/2 being unable to ambulate/stand much     - May improve once PAD is addressed, and pt is able to ambulate    Peripheral arterial disease (HCC)  - Vascular referral placed at last visit  - Will print order for patient to set up appointment  - Likely needs antiplatelet      Return in about 6 weeks (around 11/16/2022).    Signed by: Almas Abebe M.D.

## 2022-10-05 NOTE — ASSESSMENT & PLAN NOTE
- Smoking 6-10 cigarettes/day, has not been using Nicotine patches (unable to buy before due to financial reasons)  - Start Nicotine patch and gradually taper off cigarettes  - F/u in 6 weeks

## 2022-10-05 NOTE — ASSESSMENT & PLAN NOTE
- Vascular referral placed at last visit  - Will print order for patient to set up appointment  - Likely needs antiplatelet

## 2022-10-05 NOTE — ASSESSMENT & PLAN NOTE
- No limited ROM but unable to stand for long periods of time  - Has not been able to exercise 2/2 significant PAD  - Has had sciatica in past but reports that this feels different than that     - At that time, it was treated with surgery  - May be related to lack of muscle use 2/2 being unable to ambulate/stand much     - May improve once PAD is addressed, and pt is able to ambulate

## 2022-10-06 ENCOUNTER — TELEPHONE (OUTPATIENT)
Dept: HEMATOLOGY ONCOLOGY | Facility: MEDICAL CENTER | Age: 67
End: 2022-10-06
Payer: COMMERCIAL

## 2022-10-19 ENCOUNTER — APPOINTMENT (OUTPATIENT)
Dept: HEMATOLOGY ONCOLOGY | Facility: MEDICAL CENTER | Age: 67
End: 2022-10-19
Payer: COMMERCIAL

## 2022-11-02 ENCOUNTER — PATIENT MESSAGE (OUTPATIENT)
Dept: HEALTH INFORMATION MANAGEMENT | Facility: OTHER | Age: 67
End: 2022-11-02

## 2022-11-14 RX ORDER — ALBUTEROL SULFATE 90 UG/1
1-2 AEROSOL, METERED RESPIRATORY (INHALATION) EVERY 4 HOURS PRN
Qty: 6.7 EACH | Refills: 1 | Status: SHIPPED | OUTPATIENT
Start: 2022-11-14 | End: 2023-03-13

## 2022-11-14 NOTE — TELEPHONE ENCOUNTER
Received request via: Patient    Was the patient seen in the last year in this department? Yes    Does the patient have an active prescription (recently filled or refills available) for medication(s) requested? No    Does the patient have [facility] and need 100 day supply (blood pressure, diabetes and cholesterol meds only)? Patient does not have SCP

## 2022-12-16 ENCOUNTER — HOSPITAL ENCOUNTER (OUTPATIENT)
Facility: MEDICAL CENTER | Age: 67
End: 2022-12-16
Payer: COMMERCIAL

## 2022-12-16 ENCOUNTER — OFFICE VISIT (OUTPATIENT)
Dept: INTERNAL MEDICINE | Facility: OTHER | Age: 67
End: 2022-12-16
Payer: COMMERCIAL

## 2022-12-16 ENCOUNTER — HOSPITAL ENCOUNTER (OUTPATIENT)
Facility: MEDICAL CENTER | Age: 67
End: 2022-12-16
Attending: INTERNAL MEDICINE
Payer: COMMERCIAL

## 2022-12-16 VITALS
OXYGEN SATURATION: 91 % | TEMPERATURE: 97.7 F | WEIGHT: 277.6 LBS | SYSTOLIC BLOOD PRESSURE: 127 MMHG | BODY MASS INDEX: 41.12 KG/M2 | HEART RATE: 79 BPM | HEIGHT: 69 IN | DIASTOLIC BLOOD PRESSURE: 61 MMHG

## 2022-12-16 DIAGNOSIS — N39.0 URINARY TRACT INFECTION WITHOUT HEMATURIA, SITE UNSPECIFIED: ICD-10-CM

## 2022-12-16 DIAGNOSIS — L72.0 EPIDERMOID CYST: ICD-10-CM

## 2022-12-16 LAB
APPEARANCE UR: CLEAR
BILIRUB UR STRIP-MCNC: NEGATIVE MG/DL
COLOR UR AUTO: YELLOW
FORWARD REASON: SPWHY: NORMAL
FORWARD REASON: SPWHY: NORMAL
FORWARDED TO LAB: SPWHR: NORMAL
FORWARDED TO LAB: SPWHR: NORMAL
GLUCOSE UR STRIP.AUTO-MCNC: NEGATIVE MG/DL
KETONES UR STRIP.AUTO-MCNC: NEGATIVE MG/DL
LEUKOCYTE ESTERASE UR QL STRIP.AUTO: NORMAL
NITRITE UR QL STRIP.AUTO: NEGATIVE
PH UR STRIP.AUTO: 5.5 [PH] (ref 5–8)
PROT UR QL STRIP: NEGATIVE MG/DL
RBC UR QL AUTO: NORMAL
SP GR UR STRIP.AUTO: 1.02
SPECIMEN SENT: SPWT1: NORMAL
SPECIMEN SENT: SPWT1: NORMAL
UROBILINOGEN UR STRIP-MCNC: NORMAL MG/DL

## 2022-12-16 PROCEDURE — 81002 URINALYSIS NONAUTO W/O SCOPE: CPT | Mod: GC

## 2022-12-16 PROCEDURE — 99214 OFFICE O/P EST MOD 30 MIN: CPT | Mod: GC

## 2022-12-19 NOTE — PROGRESS NOTES
Called patient to enquire regarding symptoms. He states that the dysuria is resolving.. Back pain persists however patient thinks that it may be MSK which he has had in the past. We re still waiting on UA results sent to lab. Patient  encouraged to get BMP and other lab tests done as soon as he can. We will hold off on any antibiotics given that his dysuria is getting better.

## 2022-12-28 ENCOUNTER — OFFICE VISIT (OUTPATIENT)
Dept: INTERNAL MEDICINE | Facility: OTHER | Age: 67
End: 2022-12-28
Payer: COMMERCIAL

## 2022-12-28 VITALS
SYSTOLIC BLOOD PRESSURE: 115 MMHG | DIASTOLIC BLOOD PRESSURE: 67 MMHG | WEIGHT: 272.4 LBS | OXYGEN SATURATION: 93 % | HEART RATE: 83 BPM | HEIGHT: 69 IN | TEMPERATURE: 98.8 F | BODY MASS INDEX: 40.35 KG/M2

## 2022-12-28 DIAGNOSIS — M54.50 CHRONIC MIDLINE LOW BACK PAIN WITHOUT SCIATICA: ICD-10-CM

## 2022-12-28 DIAGNOSIS — B18.2 CHRONIC HEPATITIS C WITHOUT HEPATIC COMA (HCC): ICD-10-CM

## 2022-12-28 DIAGNOSIS — G89.29 CHRONIC MIDLINE LOW BACK PAIN WITHOUT SCIATICA: ICD-10-CM

## 2022-12-28 DIAGNOSIS — N18.30 STAGE 3 CHRONIC KIDNEY DISEASE, UNSPECIFIED WHETHER STAGE 3A OR 3B CKD: ICD-10-CM

## 2022-12-28 DIAGNOSIS — I73.9 PERIPHERAL ARTERIAL DISEASE (HCC): ICD-10-CM

## 2022-12-28 DIAGNOSIS — K21.9 GASTROESOPHAGEAL REFLUX DISEASE WITHOUT ESOPHAGITIS: ICD-10-CM

## 2022-12-28 PROCEDURE — 99213 OFFICE O/P EST LOW 20 MIN: CPT | Mod: GC | Performed by: STUDENT IN AN ORGANIZED HEALTH CARE EDUCATION/TRAINING PROGRAM

## 2022-12-28 RX ORDER — OMEPRAZOLE 20 MG/1
20 CAPSULE, DELAYED RELEASE ORAL DAILY
Qty: 90 CAPSULE | Refills: 0 | Status: SHIPPED | OUTPATIENT
Start: 2022-12-28 | End: 2023-03-30

## 2022-12-28 ASSESSMENT — ENCOUNTER SYMPTOMS
BACK PAIN: 1
MYALGIAS: 0
NERVOUS/ANXIOUS: 0
BACK PAIN: 1
RESPIRATORY NEGATIVE: 1
FLANK PAIN: 1
HEADACHES: 0
FEVER: 0
NAUSEA: 1
DOUBLE VISION: 0
DIAPHORESIS: 0
VOMITING: 0
FEVER: 0
CARDIOVASCULAR NEGATIVE: 1
DIARRHEA: 0
ABDOMINAL PAIN: 1
CHILLS: 0
NECK PAIN: 0
MYALGIAS: 0
PSYCHIATRIC NEGATIVE: 1
SHORTNESS OF BREATH: 0
DEPRESSION: 0
HEARTBURN: 1
CONSTIPATION: 0
BLURRED VISION: 0
NEUROLOGICAL NEGATIVE: 1
CHILLS: 0
COUGH: 0
GASTROINTESTINAL NEGATIVE: 1
CONSTITUTIONAL NEGATIVE: 1
EYES NEGATIVE: 1
PALPITATIONS: 0
WEAKNESS: 0

## 2022-12-28 NOTE — ASSESSMENT & PLAN NOTE
- Has lost weight since last visit but continues to have back pain issues  - Will try PT and see if any improvement    - If no improvement, have to consider further imaging or work-up of back pain

## 2022-12-28 NOTE — PROGRESS NOTES
"    Established Patient    Patient Care Team:  Almas Abebe M.D. as PCP - General (Internal Medicine)    CC:   Chief Complaint   Patient presents with    Dysuria       HPI:  Pee Antoine is a 67 y.o. male who presents today with the following Chief Complaint(s): pain with urination.    The patient has noted for about 3 days prior to this appointment, he has had burning and stinging sensation with urination. This has coincided with ~2 days of right flank pain that is dull and difficult to characterize, but the patient states adamantly that this flank pain is different than his normal chronic back pain due to its location more lateral and lesser intensity than his normal back pain. The patient endorses testicular pain as well. The patient denies any recent instrumentation and is compliant with BPH medication. The patient also denies aggressive hygiene or unprotected intercourse outside of his monogamous relationship.  The patient is not currently drinking alcohol. The patient denies any discharge or rash. The patient also denies any changes to the color of his urine or reduced urine output.    The patient also mentions secondary concerns referring to \"bumps\" on his forearms. The patient has had spontaneous formation of nodules over his left forearm over the course of a few months that are not associated with any pain, warmth, swelling, or fluid collection. The patient does endorse \"popping\" one of these nodules and that clear serous fluid followed by blood were expressed.       ROS:       Review of Systems   Constitutional: Negative.  Negative for chills, diaphoresis, fever and malaise/fatigue.   HENT: Negative.     Eyes: Negative.    Respiratory: Negative.     Cardiovascular: Negative.    Gastrointestinal: Negative.    Genitourinary:  Positive for dysuria, flank pain and urgency. Negative for frequency and hematuria.   Musculoskeletal:  Positive for back pain and joint pain. Negative for myalgias and " neck pain.   Skin: Negative.  Negative for itching and rash.   Neurological: Negative.    Endo/Heme/Allergies: Negative.    Psychiatric/Behavioral: Negative.         Past Medical History:   Diagnosis Date    Arterial disease (HCC)     Combined systolic and diastolic congestive heart failure (HCC)     COPD (chronic obstructive pulmonary disease) (HCC)     Gout     Kidney disease     Liver disease      Patient Active Problem List    Diagnosis Date Noted    Personal history of nicotine dependence  10/05/2022    Chronic midline low back pain without sciatica 06/06/2022    BMI 40.0-44.9, adult (HCC) 02/22/2022    Post-nasal drip 02/22/2022    Heartburn symptom 11/10/2021    Abnormal weight gain 11/10/2021    Abnormal stress test 10/06/2021    Peripheral arterial disease (HCC) 10/06/2021    Stage 3 chronic kidney disease (HCC) 02/12/2021    Raynauds syndrome 03/17/2020    Hepatitis C, chronic (HCC) 02/10/2020    Obstructive sleep apnea syndrome in adult 01/28/2020    Dyslipidemia 01/15/2020    Heart failure (HCC) 01/14/2020    Chronic obstructive pulmonary disease (HCC) 07/16/2019    Sciatica 07/16/2019    Gout, chronic 03/18/2013     Social History     Tobacco Use    Smoking status: Every Day     Packs/day: 0.25     Years: 50.00     Pack years: 12.50     Types: Cigarettes     Start date: 1972    Smokeless tobacco: Never   Vaping Use    Vaping Use: Never used   Substance Use Topics    Alcohol use: Yes     Comment: occassional    Drug use: No     Current Outpatient Medications   Medication Sig Dispense Refill    albuterol 108 (90 Base) MCG/ACT Aero Soln inhalation aerosol Inhale 1-2 Puffs every four hours as needed for Shortness of Breath. 6.7 Each 1    allopurinol (ZYLOPRIM) 100 MG Tab TAKE 1 TABLET BY MOUTH EVERY DAY 90 Tablet 1    atorvastatin (LIPITOR) 80 MG tablet Take 1 Tablet by mouth every day. 90 Tablet 1    spironolactone (ALDACTONE) 25 MG Tab Take 1 Tablet by mouth every day. 90 Tablet 1    metoprolol tartrate  "(LOPRESSOR) 25 MG Tab Take 1 Tablet by mouth 2 times a day. 60 Tablet 5    nicotine (NICODERM) 14 MG/24HR PATCH 24 HR Place 1 Patch on the skin every 24 hours. 28 Patch 5    fluticasone (FLONASE) 50 MCG/ACT nasal spray ADMINISTER 1 SPRAY INTO AFFECTED NOSTRIL(S) EVERY DAY. 16 mL 1    furosemide (LASIX) 20 MG Tab Take 20 mg by mouth every day.      lisinopril (PRINIVIL) 2.5 MG Tab Take 2.5 mg by mouth every day.      aspirin EC (ECOTRIN) 81 MG TBEC Take 1 Tab by mouth every day. 30 Tab 6    tiotropium (SPIRIVA) 18 MCG CAPS Inhale 1 Cap by mouth every day. (Patient not taking: Reported on 10/5/2022) 30 Cap 3     No current facility-administered medications for this visit.     Past Surgical History:   Procedure Laterality Date    HERNIA REPAIR      LUMBAR LAMINECTOMY DISKECTOMY       Family History   Problem Relation Age of Onset    Cancer Mother      Social History     Tobacco Use    Smoking status: Every Day     Packs/day: 0.25     Years: 50.00     Pack years: 12.50     Types: Cigarettes     Start date: 1972    Smokeless tobacco: Never   Vaping Use    Vaping Use: Never used   Substance Use Topics    Alcohol use: Yes     Comment: occassional    Drug use: No       Allergies:  Patient has no known allergies.    Physical Exam:  /61 (BP Location: Left arm, Patient Position: Sitting, BP Cuff Size: Large adult)   Pulse 79   Temp 36.5 °C (97.7 °F) (Temporal)   Ht 1.753 m (5' 9\")   Wt (!) 126 kg (277 lb 9.6 oz)   SpO2 91%   BMI 40.99 kg/m²     General: Well-developed, well-nourished male in no distress  HEENT: NC/AT  Eyes: Conjuntiva without any obvious injection or erythema.   Mouth: mucous membranes moist, no erythema  Neck: Supple, moving spontaneously  Lungs: Clear to auscultation bilaterally with good respiratory effort.  No wheezes, crackles or rhonci are heard.  Heart: Normal rate, regular rhythm with no murmurs, rubs or gallops heard.  Abdomen: Soft, non-tender, non-distended. No guarding or rigidity. No " organomegaly noted. No CVA tenderness bilaterally.  Extremites: No cyanosis/clubbing/edema. No obvious deformities.  Genital exam: testicles without tenderness to palpation bilaterally, no fluctuance. No tenderness over the epididymis bilaterally. No visible rash or discharge from penis. Meatus is patent.  Skin: Warm and dry.  No visible rashes. 2 non-tender nodules without fluctuance noted over left forearm.  Neuro: Alert & oriented, grossly non-focal  Psych: Patient is awake, alert and oriented with recent and remote memory intact. Affect normal, mood normal, judgment normal.    Assessment and Plan:     1. Urinary tract infection without hematuria, site unspecified  While the patient does endorse concerning flank pain that requires evaluation for pyelonephritis, the patient does not have sensitive or specific signs of this more serious infection, such as CVA tenderness or nausea. Differential includes cystitis vs STI. POC showing no concerning signs of UTI, but sample gathered at clinic was sent for formal urinalysis and culture.  - POCT Urinalysis  - URINALYSIS,CULTURE IF INDICATED; Future  - HIV AG/AB Combo Assay Screening; Future  - T.Pallidum AB SHAR (Screening); Future  - Trichomonas Vaginalis by TMA; Future  - Hepatitis C Virus Antibody; Future  - HEP B Surface Antibody; Future  - Hep B Core AB Total; Future  - Hep B Surface Antigen; Future  - Basic Metabolic Panel; Future  - Chlamydia/GC, PCR (Urine); Future    2. Epidermoid Cyst  The patient was counseled on the benign nature of this condition, not desiring any intervention at this time.    All imaging results and lab results and consult notes are reviewed at this visit.        Signed by: Thomas Motley M.D.  PGY I

## 2022-12-28 NOTE — ASSESSMENT & PLAN NOTE
- Recent Hep panel positive for HCV Ab  - Has been treated in past for this  - Previous imaging of liver showed some fibrosis so will check HCV viral load

## 2022-12-28 NOTE — ASSESSMENT & PLAN NOTE
- Chronic heartburn for years, now causing pain down to his abdomen  - Associated with nausea  - Was prescribed Omperazole last year and noted improvement  - Will try Omeprazole (20 mg, daily) again and re-evaluate in 6 weeks

## 2022-12-28 NOTE — ASSESSMENT & PLAN NOTE
- Chronic issue, stable/slightly worse  - Skin discoloration present on BL hands, slightly worse than a few months ago, along with visible veins on lower extremity  - Last referral sent to Cardiologist who did not manage it effectively, per pt - will place new referral  - Printed out order for previous imaging ordered last last year - pt to get them done prior to next visit  - F/u in 2 weeks

## 2022-12-28 NOTE — PROGRESS NOTES
"omeprazEstablished Patient    Pee Antoine is a 67 y.o. male who presents today with the following:    CC: Multiple complaints    HPI:      Abdominal pain - has been a chronic issue for years. He notes that he was previously on Omeprazole (20 mg, daily) about a year ago and noticed no pain then. He notes that his current episodes occur every time he eats and has noticed the heartburn has been worse. He describes it as a \"sick\" feeling that feels like \"acid\" and that he has associated nausea but no vomiting. He denies any diarrhea or constipation but notes that it may happen occasionally.        PAD - has not yet followed up with an appropriate specialist - previous referral went to Cardiology, who did not evaluate him at all for that. New referral to be processed. Also admits that he has severely reduced his cigarette use - now down to a couple per day. Currently on Nicotine patch.       Back pain - continues to be chronic issue for patient. Has not had any improvement in past year, has not been ambulating or exercising well. Would like to see if PT can benefit him.     Review of Systems   Constitutional:  Negative for chills and fever.   HENT:  Negative for hearing loss and tinnitus.    Eyes:  Negative for blurred vision and double vision.   Respiratory:  Negative for cough and shortness of breath.    Cardiovascular:  Negative for chest pain, palpitations and leg swelling.   Gastrointestinal:  Positive for abdominal pain, heartburn and nausea. Negative for constipation, diarrhea and vomiting.   Genitourinary:  Negative for dysuria and urgency.        Occasional dribbling, no straining   Musculoskeletal:  Positive for back pain (chronic). Negative for joint pain and myalgias.   Neurological:  Negative for weakness and headaches.   Psychiatric/Behavioral:  Negative for depression. The patient is not nervous/anxious.      Patient Active Problem List    Diagnosis Date Noted    Personal history of nicotine " dependence  10/05/2022    Chronic midline low back pain without sciatica 06/06/2022    BMI 40.0-44.9, adult ([facility]) 02/22/2022    Post-nasal drip 02/22/2022    Gastroesophageal reflux disease without esophagitis 11/10/2021    Abnormal weight gain 11/10/2021    Abnormal stress test 10/06/2021    Peripheral arterial disease (HCC) 10/06/2021    Stage 3 chronic kidney disease ([facility]) 02/12/2021    Raynauds syndrome 03/17/2020    Hepatitis C, chronic ([facility]) 02/10/2020    Obstructive sleep apnea syndrome in adult 01/28/2020    Dyslipidemia 01/15/2020    Heart failure ([facility]) 01/14/2020    Chronic obstructive pulmonary disease ([facility]) 07/16/2019    Sciatica 07/16/2019    Gout, chronic 03/18/2013       Social History     Tobacco Use    Smoking status: Every Day     Packs/day: 0.25     Years: 50.00     Pack years: 12.50     Types: Cigarettes     Start date: 1972    Smokeless tobacco: Never   Vaping Use    Vaping Use: Never used   Substance Use Topics    Alcohol use: Not Currently     Comment: occassional    Drug use: No       Current Outpatient Medications   Medication Sig Dispense Refill    omeprazole (PRILOSEC) 20 MG delayed-release capsule Take 1 Capsule by mouth every day. 90 Capsule 0    albuterol 108 (90 Base) MCG/ACT Aero Soln inhalation aerosol Inhale 1-2 Puffs every four hours as needed for Shortness of Breath. 6.7 Each 1    allopurinol (ZYLOPRIM) 100 MG Tab TAKE 1 TABLET BY MOUTH EVERY DAY 90 Tablet 1    atorvastatin (LIPITOR) 80 MG tablet Take 1 Tablet by mouth every day. 90 Tablet 1    spironolactone (ALDACTONE) 25 MG Tab Take 1 Tablet by mouth every day. 90 Tablet 1    metoprolol tartrate (LOPRESSOR) 25 MG Tab Take 1 Tablet by mouth 2 times a day. 60 Tablet 5    nicotine (NICODERM) 14 MG/24HR PATCH 24 HR Place 1 Patch on the skin every 24 hours. 28 Patch 5    fluticasone (FLONASE) 50 MCG/ACT nasal spray ADMINISTER 1 SPRAY INTO AFFECTED NOSTRIL(S) EVERY DAY. 16 mL 1    furosemide (LASIX) 20 MG Tab Take 20 mg by mouth  "every day.      lisinopril (PRINIVIL) 2.5 MG Tab Take 2.5 mg by mouth every day.      aspirin EC (ECOTRIN) 81 MG TBEC Take 1 Tab by mouth every day. 30 Tab 6    tiotropium (SPIRIVA) 18 MCG CAPS Inhale 1 Cap by mouth every day. 30 Cap 3     No current facility-administered medications for this visit.       /67 (BP Location: Right arm, Patient Position: Sitting, BP Cuff Size: Adult)   Pulse 83   Temp 37.1 °C (98.8 °F) (Temporal)   Ht 1.753 m (5' 9\")   Wt 124 kg (272 lb 6.4 oz)   SpO2 93%   BMI 40.23 kg/m²     PHYSICAL EXAM:  Physical Exam  Constitutional:       General: He is not in acute distress.     Appearance: Normal appearance. He is obese. He is not ill-appearing.      Comments: Uses cane for ambulation   HENT:      Head: Normocephalic and atraumatic.      Mouth/Throat:      Mouth: Mucous membranes are moist.   Eyes:      Extraocular Movements: Extraocular movements intact.      Conjunctiva/sclera: Conjunctivae normal.   Cardiovascular:      Rate and Rhythm: Normal rate and regular rhythm.      Heart sounds: Normal heart sounds. No murmur heard.    No friction rub. No gallop.   Pulmonary:      Effort: Pulmonary effort is normal. No respiratory distress.      Breath sounds: Normal breath sounds. No wheezing or rales.   Abdominal:      General: Bowel sounds are normal. There is no distension.      Palpations: Abdomen is soft.      Tenderness: There is no abdominal tenderness.   Musculoskeletal:      Right lower leg: No edema.      Left lower leg: No edema.   Skin:     Findings: Erythema (skin discoloration of BL hands, slowly protrudring up forearms) and lesion (dermoid cyst present on L forearm) present.      Comments: Veins visible on BLEs   Neurological:      General: No focal deficit present.      Mental Status: He is alert and oriented to person, place, and time. Mental status is at baseline.      Sensory: Sensory deficit (Decreased sensation in LLE - absent in lateral aspects, chronic in nature) " present.      Motor: No weakness.   Psychiatric:         Mood and Affect: Mood normal.         Behavior: Behavior normal.       Assessment and Plan  Gastroesophageal reflux disease without esophagitis  - Chronic heartburn for years, now causing pain down to his abdomen  - Associated with nausea  - Was prescribed Omperazole last year and noted improvement  - Will try Omeprazole (20 mg, daily) again and re-evaluate in 6 weeks    Peripheral arterial disease (HCC)  - Chronic issue, stable/slightly worse  - Skin discoloration present on BL hands, slightly worse than a few months ago, along with visible veins on lower extremity  - Last referral sent to Cardiologist who did not manage it effectively, per pt - will place new referral  - Printed out order for previous imaging ordered last last year - pt to get them done prior to next visit  - F/u in 2 weeks    Hepatitis C, chronic (HCC)  - Recent Hep panel positive for HCV Ab  - Has been treated in past for this  - Previous imaging of liver showed some fibrosis so will check HCV viral load    Chronic midline low back pain without sciatica  - Has lost weight since last visit but continues to have back pain issues  - Will try PT and see if any improvement    - If no improvement, have to consider further imaging or work-up of back pain      Return in about 2 weeks (around 1/11/2023) for CKD follow-up and imaging for PAD.    - Repeat follow-up in 6 weeks for PAD    Signed by: Almas Abebe M.D.

## 2023-01-10 ENCOUNTER — OFFICE VISIT (OUTPATIENT)
Dept: INTERNAL MEDICINE | Facility: OTHER | Age: 68
End: 2023-01-10
Payer: COMMERCIAL

## 2023-01-10 VITALS
SYSTOLIC BLOOD PRESSURE: 127 MMHG | HEIGHT: 69 IN | WEIGHT: 272.8 LBS | HEART RATE: 83 BPM | BODY MASS INDEX: 40.4 KG/M2 | TEMPERATURE: 98.5 F | DIASTOLIC BLOOD PRESSURE: 68 MMHG | OXYGEN SATURATION: 94 %

## 2023-01-10 DIAGNOSIS — E66.01 MORBID OBESITY WITH BMI OF 40.0-44.9, ADULT (HCC): ICD-10-CM

## 2023-01-10 DIAGNOSIS — Z87.891 PERSONAL HISTORY OF NICOTINE DEPENDENCE: ICD-10-CM

## 2023-01-10 DIAGNOSIS — I73.9 PERIPHERAL ARTERIAL DISEASE (HCC): ICD-10-CM

## 2023-01-10 DIAGNOSIS — I50.32 CHRONIC DIASTOLIC HEART FAILURE (HCC): ICD-10-CM

## 2023-01-10 PROCEDURE — 99213 OFFICE O/P EST LOW 20 MIN: CPT | Mod: GE | Performed by: STUDENT IN AN ORGANIZED HEALTH CARE EDUCATION/TRAINING PROGRAM

## 2023-01-10 RX ORDER — DIGOXIN 125 MCG
125 TABLET ORAL
COMMUNITY
Start: 2022-10-22 | End: 2023-01-10

## 2023-01-10 ASSESSMENT — PATIENT HEALTH QUESTIONNAIRE - PHQ9: CLINICAL INTERPRETATION OF PHQ2 SCORE: 0

## 2023-01-10 NOTE — PATIENT INSTRUCTIONS
Call 's office and make an appointment, let us know if you need referral.  Make an appointment for nutrition

## 2023-01-11 DIAGNOSIS — N18.30 STAGE 3 CHRONIC KIDNEY DISEASE, UNSPECIFIED WHETHER STAGE 3A OR 3B CKD: ICD-10-CM

## 2023-01-11 DIAGNOSIS — N39.0 URINARY TRACT INFECTION WITHOUT HEMATURIA, SITE UNSPECIFIED: ICD-10-CM

## 2023-01-12 NOTE — PROGRESS NOTES
Established Patient      CC: PAD follow up.    HPI:   This is 68 yo male with a history of CHF,GERD, Stage 3 CKD who is presenting for follow up of PAD.  Patient still has the same purpura in upper and lower extremities. It has not been getting worse or better. Romulo claudication or any pain associated with purpura. Denies worsening swelling. Denies chest pain, palpitation, shortness of breath, lightheadedness, blood in stools, melena, weakness, paresthesia, change in vision.    Patient has creatinine of 1.62, BUN 32 and GFR 46.   Denies hematuria, foamy urine, fever, chills.     Patient has not had LUDWIN done yet, but it scheduled on 2/10.     Last time he saw a cardiologist was 1 year ago.    Patient declines referral to dietician.    Patient continues to smoke a few cigarettes although he is on nicotine patch. He states that nicotine patch helps him cut down on smoking cigarettes. Smoking cessation counseling has been provided today's visit.    ROS:  See HPI    Patient Active Problem List    Diagnosis Date Noted    Morbid obesity with BMI of 40.0-44.9, adult ([facility]) 01/10/2023    Personal history of nicotine dependence  10/05/2022    Chronic midline low back pain without sciatica 06/06/2022    BMI 40.0-44.9, adult ([facility]) 02/22/2022    Post-nasal drip 02/22/2022    Gastroesophageal reflux disease without esophagitis 11/10/2021    Abnormal weight gain 11/10/2021    Abnormal stress test 10/06/2021    Peripheral arterial disease (HCC) 10/06/2021    Stage 3 chronic kidney disease (HCC) 02/12/2021    Raynauds syndrome 03/17/2020    Hepatitis C, chronic ([facility]) 02/10/2020    Obstructive sleep apnea syndrome in adult 01/28/2020    Dyslipidemia 01/15/2020    Heart failure (HCC) 01/14/2020    Chronic obstructive pulmonary disease (HCC) 07/16/2019    Sciatica 07/16/2019    Gout, chronic 03/18/2013       Social History     Tobacco Use    Smoking status: Every Day     Packs/day: 0.25     Years: 50.00     Pack years: 12.50     Types:  "Cigarettes     Start date: 1972    Smokeless tobacco: Never   Vaping Use    Vaping Use: Never used   Substance Use Topics    Alcohol use: Not Currently     Comment: occassional    Drug use: No       Current Outpatient Medications   Medication Sig Dispense Refill    omeprazole (PRILOSEC) 20 MG delayed-release capsule Take 1 Capsule by mouth every day. 90 Capsule 0    albuterol 108 (90 Base) MCG/ACT Aero Soln inhalation aerosol Inhale 1-2 Puffs every four hours as needed for Shortness of Breath. 6.7 Each 1    allopurinol (ZYLOPRIM) 100 MG Tab TAKE 1 TABLET BY MOUTH EVERY DAY 90 Tablet 1    atorvastatin (LIPITOR) 80 MG tablet Take 1 Tablet by mouth every day. 90 Tablet 1    spironolactone (ALDACTONE) 25 MG Tab Take 1 Tablet by mouth every day. 90 Tablet 1    metoprolol tartrate (LOPRESSOR) 25 MG Tab Take 1 Tablet by mouth 2 times a day. 60 Tablet 5    nicotine (NICODERM) 14 MG/24HR PATCH 24 HR Place 1 Patch on the skin every 24 hours. 28 Patch 5    fluticasone (FLONASE) 50 MCG/ACT nasal spray ADMINISTER 1 SPRAY INTO AFFECTED NOSTRIL(S) EVERY DAY. 16 mL 1    furosemide (LASIX) 20 MG Tab Take 20 mg by mouth every day.      lisinopril (PRINIVIL) 2.5 MG Tab Take 2.5 mg by mouth every day.      aspirin EC (ECOTRIN) 81 MG TBEC Take 1 Tab by mouth every day. 30 Tab 6    tiotropium (SPIRIVA) 18 MCG CAPS Inhale 1 Cap by mouth every day. 30 Cap 3     No current facility-administered medications for this visit.       /68 (BP Location: Right arm, Patient Position: Sitting, BP Cuff Size: Adult)   Pulse 83   Temp 36.9 °C (98.5 °F) (Temporal)   Ht 1.753 m (5' 9\")   Wt 124 kg (272 lb 12.8 oz)   SpO2 94%   BMI 40.29 kg/m²     PHYSICAL EXAM:  Physical Exam  Constitutional:       General: He is not in acute distress.     Appearance: Normal appearance. He is obese. He is not ill-appearing.      Comments: Uses cane for ambulation   HENT:      Head: Normocephalic and atraumatic.      Mouth/Throat:      Mouth: Mucous membranes " are moist.   Eyes:      Extraocular Movements: Extraocular movements intact.      Conjunctiva/sclera: Conjunctivae normal.   Cardiovascular:      Rate and Rhythm: Normal rate and regular rhythm.      Heart sounds: Normal heart sounds. No murmur heard.    No friction rub. No gallop.      Comments: Palpable but diminished pulses 1+ present in dorsalis and tibialis posterior arteries.  Pulmonary:      Effort: Pulmonary effort is normal. No respiratory distress.      Breath sounds: Normal breath sounds. No wheezing or rales.   Abdominal:      General: Bowel sounds are normal. There is no distension.      Palpations: Abdomen is soft.      Tenderness: There is no abdominal tenderness.   Musculoskeletal:      Right lower leg: No edema.      Left lower leg: No edema.   Skin:     Findings: No lesion (dermoid cyst present on L forearm). Erythema: skin discoloration of BL hands, slowly protrudring up forearms.     Comments: Veins visible on BLEs.  Blanching purpura present in both upper and lower extremities bilaterally.    Neurological:      General: No focal deficit present.      Mental Status: He is alert and oriented to person, place, and time. Mental status is at baseline.      Motor: No weakness.   Psychiatric:         Mood and Affect: Mood normal.         Behavior: Behavior normal.         Thought Content: Thought content normal.         Judgment: Judgment normal.       Assessment and Plan  1. Peripheral arterial disease (HCC)  His extremity findings may be related to his CHF as well but warrants further evaluation of PAD given risk factors including smoking.  -Patient has not had LUDWIN done yet, but it is scheduled on 2/10.     2. Chronic diastolic heart failure (HCC)  Last time he saw a cardiologist was 1 year ago. Currently asymptomatic other than his extremity findings.  Reminded patient to follow up with cardiology    3. Morbid obesity with BMI of 40.0-44.9, adult (HCC)  - Patient identified as having weight  management issue.  Appropriate orders and counseling given.  -Patient declines referral to dietician.    4. Personal history of nicotine dependence   Patient continues to smoke a few cigarettes although he is on nicotine patch. He states that nicotine patch helps him cut down on smoking cigarettes. Smoking cessation counseling has been provided today's visit.  -will continue nicotine patch for now since patient states that it helps him cutting down on smoking           Return in about 2 weeks (around 1/24/2023).      Signed by: Tacho Garza M.D.

## 2023-01-31 ENCOUNTER — OFFICE VISIT (OUTPATIENT)
Dept: INTERNAL MEDICINE | Facility: OTHER | Age: 68
End: 2023-01-31
Payer: COMMERCIAL

## 2023-01-31 VITALS
TEMPERATURE: 98.8 F | SYSTOLIC BLOOD PRESSURE: 132 MMHG | HEIGHT: 69 IN | WEIGHT: 275.6 LBS | OXYGEN SATURATION: 94 % | DIASTOLIC BLOOD PRESSURE: 71 MMHG | HEART RATE: 83 BPM | BODY MASS INDEX: 40.82 KG/M2

## 2023-01-31 DIAGNOSIS — I73.9 PERIPHERAL ARTERIAL DISEASE (HCC): ICD-10-CM

## 2023-01-31 DIAGNOSIS — I50.32 CHRONIC DIASTOLIC HEART FAILURE (HCC): ICD-10-CM

## 2023-01-31 DIAGNOSIS — M54.50 CHRONIC MIDLINE LOW BACK PAIN WITHOUT SCIATICA: ICD-10-CM

## 2023-01-31 DIAGNOSIS — N18.31 STAGE 3A CHRONIC KIDNEY DISEASE: ICD-10-CM

## 2023-01-31 DIAGNOSIS — G89.29 CHRONIC MIDLINE LOW BACK PAIN WITHOUT SCIATICA: ICD-10-CM

## 2023-01-31 PROBLEM — R63.5 ABNORMAL WEIGHT GAIN: Status: RESOLVED | Noted: 2021-11-10 | Resolved: 2023-01-31

## 2023-01-31 PROCEDURE — 99214 OFFICE O/P EST MOD 30 MIN: CPT | Mod: GC | Performed by: STUDENT IN AN ORGANIZED HEALTH CARE EDUCATION/TRAINING PROGRAM

## 2023-01-31 ASSESSMENT — ENCOUNTER SYMPTOMS
NERVOUS/ANXIOUS: 0
VOMITING: 0
CHILLS: 0
DOUBLE VISION: 0
DEPRESSION: 0
MYALGIAS: 0
FEVER: 0
CONSTIPATION: 0
DIARRHEA: 0
ABDOMINAL PAIN: 0
BACK PAIN: 1
NAUSEA: 0
COUGH: 1
WEAKNESS: 0
PALPITATIONS: 0
BLURRED VISION: 0
HEADACHES: 0
SHORTNESS OF BREATH: 1

## 2023-01-31 NOTE — PROGRESS NOTES
Established Patient    Pee Antoine is a 67 y.o. male who presents today with the following:    CC: PAD follow-up    HPI:      PAD - last seen 2 weeks ago, has LUDWIN scheduled for 2/10. He also has a referral to Vascular medicine for further management - he called them and was told to get his LUDWIN first and then to schedule the appointment. Today, he reports the pain remains an issue. He is still unfortunately smoking but has cut back - smokes about 5-6 cigarettes/day. He is using the patches with them.        Chronic midline back pain - has been following up with PT, they recommended to continue frequent visits for next weeks to help improve his condition. He reports the first session went well and is eager to continue with their plan.        CKD III - Cr function on recent labs was 1.68, which has improved from June 2022, where Cr was around 1.8.     Review of Systems   Constitutional:  Negative for chills and fever.   HENT:  Negative for hearing loss and tinnitus.    Eyes:  Negative for blurred vision and double vision.   Respiratory:  Positive for cough (chronic) and shortness of breath (chronic).    Cardiovascular:  Negative for chest pain, palpitations and leg swelling.   Gastrointestinal:  Negative for abdominal pain, constipation, diarrhea, nausea and vomiting.   Genitourinary:  Negative for dysuria and urgency.   Musculoskeletal:  Positive for back pain (chronic). Negative for joint pain and myalgias.   Neurological:  Negative for weakness and headaches.   Psychiatric/Behavioral:  Negative for depression. The patient is not nervous/anxious.      Patient Active Problem List    Diagnosis Date Noted    Morbid obesity with BMI of 40.0-44.9, adult ([facility]) 01/10/2023    Personal history of nicotine dependence  10/05/2022    Chronic midline low back pain without sciatica 06/06/2022    BMI 40.0-44.9, adult ([facility]) 02/22/2022    Post-nasal drip 02/22/2022    Gastroesophageal reflux disease without esophagitis  11/10/2021    Abnormal stress test 10/06/2021    Peripheral arterial disease (HCC) 10/06/2021    Stage 3 chronic kidney disease (HCC) 02/12/2021    Raynauds syndrome 03/17/2020    Hepatitis C, chronic (HCC) 02/10/2020    Obstructive sleep apnea syndrome in adult 01/28/2020    Dyslipidemia 01/15/2020    Heart failure (HCC) 01/14/2020    Chronic obstructive pulmonary disease (HCC) 07/16/2019    Sciatica 07/16/2019    Gout, chronic 03/18/2013       Social History     Tobacco Use    Smoking status: Every Day     Packs/day: 0.25     Years: 50.00     Pack years: 12.50     Types: Cigarettes     Start date: 1972    Smokeless tobacco: Never    Tobacco comments:     Trying to quit 5-10 per day    Vaping Use    Vaping Use: Never used   Substance Use Topics    Alcohol use: Not Currently     Comment: occassional    Drug use: No       Current Outpatient Medications   Medication Sig Dispense Refill    omeprazole (PRILOSEC) 20 MG delayed-release capsule Take 1 Capsule by mouth every day. 90 Capsule 0    albuterol 108 (90 Base) MCG/ACT Aero Soln inhalation aerosol Inhale 1-2 Puffs every four hours as needed for Shortness of Breath. 6.7 Each 1    allopurinol (ZYLOPRIM) 100 MG Tab TAKE 1 TABLET BY MOUTH EVERY DAY 90 Tablet 1    atorvastatin (LIPITOR) 80 MG tablet Take 1 Tablet by mouth every day. 90 Tablet 1    spironolactone (ALDACTONE) 25 MG Tab Take 1 Tablet by mouth every day. 90 Tablet 1    metoprolol tartrate (LOPRESSOR) 25 MG Tab Take 1 Tablet by mouth 2 times a day. 60 Tablet 5    nicotine (NICODERM) 14 MG/24HR PATCH 24 HR Place 1 Patch on the skin every 24 hours. 28 Patch 5    furosemide (LASIX) 20 MG Tab Take 20 mg by mouth every day.      lisinopril (PRINIVIL) 2.5 MG Tab Take 2.5 mg by mouth every day.      aspirin EC (ECOTRIN) 81 MG TBEC Take 1 Tab by mouth every day. 30 Tab 6    tiotropium (SPIRIVA) 18 MCG CAPS Inhale 1 Cap by mouth every day. 30 Cap 3     No current facility-administered medications for this visit.  "      /71 (BP Location: Right arm, Patient Position: Sitting, BP Cuff Size: Adult)   Pulse 83   Temp 37.1 °C (98.8 °F) (Temporal)   Ht 1.753 m (5' 9\")   Wt (!) 125 kg (275 lb 9.6 oz)   SpO2 94%   BMI 40.70 kg/m²     PHYSICAL EXAM:  Physical Exam  Constitutional:       General: He is not in acute distress.     Appearance: Normal appearance. He is obese. He is not ill-appearing.      Comments: Uses cane for ambulation   HENT:      Head: Normocephalic and atraumatic.      Mouth/Throat:      Mouth: Mucous membranes are moist.   Eyes:      Extraocular Movements: Extraocular movements intact.      Conjunctiva/sclera: Conjunctivae normal.   Cardiovascular:      Rate and Rhythm: Normal rate and regular rhythm.      Heart sounds: Normal heart sounds. No murmur heard.    No friction rub. No gallop.      Comments: Palpable but diminished pulses 1+ present in dorsalis and tibialis posterior arteries.  Pulmonary:      Effort: Pulmonary effort is normal. No respiratory distress.      Breath sounds: No wheezing or rales.      Comments: Mildly diminished breath sounds diffusely  Abdominal:      General: Bowel sounds are normal. There is no distension.      Palpations: Abdomen is soft.      Tenderness: There is no abdominal tenderness.   Musculoskeletal:      Right lower leg: No edema.      Left lower leg: No edema.   Skin:     Findings: No lesion (dermoid cyst present on L forearm). Erythema: skin discoloration of BL hands, slowly protrudring up forearms, R hand worse than left.     Comments: Veins visible on BLEs.  Blanching purpura present in both upper and lower extremities bilaterally.    Neurological:      General: No focal deficit present.      Mental Status: He is alert and oriented to person, place, and time. Mental status is at baseline.      Motor: No weakness.   Psychiatric:         Mood and Affect: Mood normal.         Behavior: Behavior normal.         Thought Content: Thought content normal.         " Judgment: Judgment normal.         Assessment and Plan  Peripheral arterial disease (HCC)  - Still has symptoms of claudication  - LUDWIN scheduled for 2/10  - To follow-up with Vascular after getting his LUDWIN done    Chronic midline low back pain without sciatica  - Improved after seeing PT, was given plenty of exercises to stretch out his back  - Continue working with PT    Stage 3 chronic kidney disease (HCC)  - Cr slightly improved from 6 months  - Meets criteria for CKD IIIa  - Will get kidney ultrasound to evaluate for possible etiology of CKD IIIa      Return in about 3 months (around 4/30/2023).    Signed by: Almas Abebe M.D.

## 2023-01-31 NOTE — ASSESSMENT & PLAN NOTE
- Improved after seeing PT, was given plenty of exercises to stretch out his back  - Continue working with PT

## 2023-01-31 NOTE — ASSESSMENT & PLAN NOTE
- Cr slightly improved from 6 months  - Meets criteria for CKD IIIa  - Will get kidney ultrasound to evaluate for possible etiology of CKD IIIa

## 2023-01-31 NOTE — ASSESSMENT & PLAN NOTE
- Still has symptoms of claudication  - LUDWIN scheduled for 2/10  - To follow-up with Vascular after getting his LUDWIN done

## 2023-02-13 RX ORDER — ATORVASTATIN CALCIUM 80 MG/1
80 TABLET, FILM COATED ORAL DAILY
Qty: 90 TABLET | Refills: 1 | Status: SHIPPED | OUTPATIENT
Start: 2023-02-13 | End: 2023-08-31 | Stop reason: SDUPTHER

## 2023-02-23 ENCOUNTER — PATIENT MESSAGE (OUTPATIENT)
Dept: INTERNAL MEDICINE | Facility: OTHER | Age: 68
End: 2023-02-23
Payer: COMMERCIAL

## 2023-03-11 DIAGNOSIS — R76.8 HEPATITIS C ANTIBODY TEST POSITIVE: ICD-10-CM

## 2023-03-11 DIAGNOSIS — M1A.0790 CHRONIC GOUT OF FOOT, UNSPECIFIED CAUSE, UNSPECIFIED LATERALITY: ICD-10-CM

## 2023-03-11 DIAGNOSIS — G62.9 NEUROPATHY: ICD-10-CM

## 2023-03-11 DIAGNOSIS — R74.8 ELEVATED LIVER ENZYMES: ICD-10-CM

## 2023-03-13 RX ORDER — ALBUTEROL SULFATE 90 UG/1
AEROSOL, METERED RESPIRATORY (INHALATION)
Qty: 6.7 EACH | Refills: 1 | Status: SHIPPED | OUTPATIENT
Start: 2023-03-13 | End: 2023-05-18

## 2023-03-13 RX ORDER — ALLOPURINOL 100 MG/1
100 TABLET ORAL DAILY
Qty: 90 TABLET | Refills: 1 | Status: SHIPPED | OUTPATIENT
Start: 2023-03-13 | End: 2023-06-05 | Stop reason: SDUPTHER

## 2023-03-24 ENCOUNTER — PATIENT MESSAGE (OUTPATIENT)
Dept: INTERNAL MEDICINE | Facility: OTHER | Age: 68
End: 2023-03-24
Payer: COMMERCIAL

## 2023-03-24 NOTE — LETTER
To Whomever This May Concern,       Pee Antoine has been a patient of the [facility] clinic for almost 5 years now and has been regularly following with me. He will soon be transitioning his care to one of the other physicians at this clinic, Dr. Summers.        I referred him to physical therapy for his back in June 2022 to help ease his back pain, which has been working well for him.          If there are any questions for us, please contact us at [phone].      Sincerely,      Almas Abebe MD

## 2023-03-29 NOTE — TELEPHONE ENCOUNTER
Omeprazole Refill     Received request via: Pharmacy    Was the patient seen in the last year in this department? Yes    Does the patient have an active prescription (recently filled or refills available) for medication(s) requested? No    Does the patient have USP Plus and need 100 day supply (blood pressure, diabetes and cholesterol meds only)? Patient does not have SCP

## 2023-03-30 RX ORDER — OMEPRAZOLE 20 MG/1
20 CAPSULE, DELAYED RELEASE ORAL DAILY
Qty: 90 CAPSULE | Refills: 1 | Status: SHIPPED | OUTPATIENT
Start: 2023-03-30

## 2023-04-24 ENCOUNTER — OFFICE VISIT (OUTPATIENT)
Dept: INTERNAL MEDICINE | Facility: OTHER | Age: 68
End: 2023-04-24
Payer: COMMERCIAL

## 2023-04-24 VITALS
RESPIRATION RATE: 22 BRPM | SYSTOLIC BLOOD PRESSURE: 116 MMHG | TEMPERATURE: 97.6 F | OXYGEN SATURATION: 91 % | HEART RATE: 83 BPM | HEIGHT: 69 IN | WEIGHT: 275 LBS | BODY MASS INDEX: 40.73 KG/M2 | DIASTOLIC BLOOD PRESSURE: 72 MMHG

## 2023-04-24 DIAGNOSIS — D36.7 DERMOID CYST OF ARM, LEFT: ICD-10-CM

## 2023-04-24 DIAGNOSIS — Z86.19 HX OF HEPATITIS C: ICD-10-CM

## 2023-04-24 DIAGNOSIS — I50.32 CHRONIC DIASTOLIC HEART FAILURE (HCC): ICD-10-CM

## 2023-04-24 DIAGNOSIS — I73.9 CLAUDICATION OF BOTH LOWER EXTREMITIES (HCC): ICD-10-CM

## 2023-04-24 DIAGNOSIS — I83.813 VARICOSE VEINS OF BOTH LOWER EXTREMITIES WITH PAIN: ICD-10-CM

## 2023-04-24 PROBLEM — R09.82 POST-NASAL DRIP: Status: RESOLVED | Noted: 2022-02-22 | Resolved: 2023-04-24

## 2023-04-24 PROCEDURE — 99213 OFFICE O/P EST LOW 20 MIN: CPT | Mod: GE | Performed by: STUDENT IN AN ORGANIZED HEALTH CARE EDUCATION/TRAINING PROGRAM

## 2023-04-24 ASSESSMENT — ENCOUNTER SYMPTOMS
HEADACHES: 0
NAUSEA: 0
DEPRESSION: 0
FEVER: 0
DIARRHEA: 0
BLURRED VISION: 0
NERVOUS/ANXIOUS: 0
ORTHOPNEA: 1
SHORTNESS OF BREATH: 1
DOUBLE VISION: 0
VOMITING: 0
PALPITATIONS: 0
COUGH: 1
WEAKNESS: 0
ABDOMINAL PAIN: 0
CONSTIPATION: 0
CHILLS: 0
BACK PAIN: 1
MYALGIAS: 0

## 2023-04-24 NOTE — ASSESSMENT & PLAN NOTE
- Initially thought to be PAD but recent ultrasound showed normal LUDWIN  - Unclear etiology but will have patient follow-up with Vascular medicine and Cardiology  - Has history of Hepatitis C so will check again for vasculitis/cryoglobulinemia (previous work-up was non-specific), as well as CRP

## 2023-04-24 NOTE — ASSESSMENT & PLAN NOTE
- Chronic issue for patient, thought to be PAD initially but work-up was negative  - Will refer to Dermatology for this as etiology is unclear

## 2023-04-24 NOTE — PROGRESS NOTES
Established Patient    Pee Antoine is a 67 y.o. male who presents today with the following:    CC: Claudication    HPI:      Claudication of BLE - patient had recent workup for peripheral arterial disease, which back negative. He continues to have BLE pain that is worse with exercise and better with rest. It is unclear what is causing this - he has been worked up for certain autoimmune conditions int he past but may need repeat workup.        Cough - he has been having a wet cough recently. He has also been noticed increased swelling in his arms and legs and are causing some pain. He is concerned he might need to take a few extra doses of his Lasix.       Review of Systems   Constitutional:  Negative for chills and fever.   HENT:  Negative for hearing loss and tinnitus.    Eyes:  Negative for blurred vision and double vision.   Respiratory:  Positive for cough (chronic) and shortness of breath (chronic).    Cardiovascular:  Positive for orthopnea and leg swelling. Negative for chest pain and palpitations.   Gastrointestinal:  Negative for abdominal pain, constipation, diarrhea, nausea and vomiting.   Genitourinary:  Negative for dysuria and urgency.   Musculoskeletal:  Positive for back pain (chronic). Negative for joint pain and myalgias.   Neurological:  Negative for weakness and headaches.   Psychiatric/Behavioral:  Negative for depression. The patient is not nervous/anxious.      Patient Active Problem List    Diagnosis Date Noted    Varicose veins of both lower extremities with pain 04/24/2023    Dermoid cyst of arm, left 04/24/2023    Morbid obesity with BMI of 40.0-44.9, adult ([facility]) 01/10/2023    Personal history of nicotine dependence  10/05/2022    Chronic midline low back pain without sciatica 06/06/2022    BMI 40.0-44.9, adult ([facility]) 02/22/2022    Gastroesophageal reflux disease without esophagitis 11/10/2021    Abnormal stress test 10/06/2021    Claudication of both lower extremities ([facility])  10/06/2021    Stage 3 chronic kidney disease (HCC) 02/12/2021    Raynauds syndrome 03/17/2020    Hx of hepatitis C 02/10/2020    Obstructive sleep apnea syndrome in adult 01/28/2020    Dyslipidemia 01/15/2020    Heart failure (HCC) 01/14/2020    Chronic obstructive pulmonary disease (HCC) 07/16/2019    Sciatica 07/16/2019    Gout, chronic 03/18/2013       Social History     Tobacco Use    Smoking status: Every Day     Packs/day: 0.25     Years: 50.00     Pack years: 12.50     Types: Cigarettes     Start date: 1972    Smokeless tobacco: Never    Tobacco comments:     Trying to quit 5-10 per day    Vaping Use    Vaping Use: Never used   Substance Use Topics    Alcohol use: Not Currently     Comment: occassional    Drug use: No       Current Outpatient Medications   Medication Sig Dispense Refill    omeprazole (PRILOSEC) 20 MG delayed-release capsule TAKE 1 CAPSULE BY MOUTH EVERY DAY 90 Capsule 1    albuterol 108 (90 Base) MCG/ACT Aero Soln inhalation aerosol INHALE 1 TO 2 PUFFS BY MOUTH EVERY FOUR HOURS AS NEEDED FOR SHORTNESS OF BREATH. 6.7 Each 1    allopurinol (ZYLOPRIM) 100 MG Tab TAKE 1 TABLET BY MOUTH EVERY DAY 90 Tablet 1    atorvastatin (LIPITOR) 80 MG tablet Take 1 Tablet by mouth every day. 90 Tablet 1    metoprolol tartrate (LOPRESSOR) 25 MG Tab TAKE 1 TABLET BY MOUTH TWICE A  Tablet 1    spironolactone (ALDACTONE) 25 MG Tab Take 1 Tablet by mouth every day. 90 Tablet 1    nicotine (NICODERM) 14 MG/24HR PATCH 24 HR Place 1 Patch on the skin every 24 hours. 28 Patch 5    furosemide (LASIX) 20 MG Tab Take 20 mg by mouth every day.      lisinopril (PRINIVIL) 2.5 MG Tab Take 2.5 mg by mouth every day.      aspirin EC (ECOTRIN) 81 MG TBEC Take 1 Tab by mouth every day. 30 Tab 6    tiotropium (SPIRIVA) 18 MCG CAPS Inhale 1 Cap by mouth every day. 30 Cap 3     No current facility-administered medications for this visit.       /72   Pulse 83   Temp 36.4 °C (97.6 °F) (Temporal)   Resp (!) 22   Ht  "1.753 m (5' 9\")   Wt 125 kg (275 lb)   SpO2 91%   BMI 40.61 kg/m²     PHYSICAL EXAM:  Physical Exam  Constitutional:       General: He is not in acute distress.     Appearance: Normal appearance. He is obese. He is not ill-appearing.      Comments: Uses cane for ambulation   HENT:      Head: Normocephalic and atraumatic.      Mouth/Throat:      Mouth: Mucous membranes are moist.   Eyes:      Extraocular Movements: Extraocular movements intact.      Conjunctiva/sclera: Conjunctivae normal.   Cardiovascular:      Rate and Rhythm: Normal rate and regular rhythm.      Heart sounds: Normal heart sounds. No murmur heard.    No friction rub. No gallop.      Comments: Palpable but diminished pulses 1+ present in dorsalis and tibialis posterior arteries.  Pulmonary:      Effort: Pulmonary effort is normal. No respiratory distress.      Breath sounds: No wheezing or rales.      Comments: Mildly diminished breath sounds diffusely  Abdominal:      General: Bowel sounds are normal. There is no distension.      Palpations: Abdomen is soft.      Tenderness: There is no abdominal tenderness.   Musculoskeletal:         General: Swelling (present in BL joints) present.      Right lower leg: No edema.      Left lower leg: No edema.   Skin:     Findings: No lesion (dermoid cyst present on L forearm). Erythema: skin discoloration of BL hands, slowly protrudring up forearms, R hand worse than left.     Comments: Veins visible on BLEs.  Blanching purpura present in both upper and lower extremities bilaterally.    Neurological:      General: No focal deficit present.      Mental Status: He is alert and oriented to person, place, and time. Mental status is at baseline.      Motor: No weakness.   Psychiatric:         Mood and Affect: Mood normal.         Behavior: Behavior normal.         Thought Content: Thought content normal.         Judgment: Judgment normal.         Assessment and Plan  Claudication of both lower extremities (HCC)  - " Initially thought to be PAD but recent ultrasound showed normal LUDWIN  - Unclear etiology but will have patient follow-up with Vascular medicine and Cardiology  - Has history of Hepatitis C so will check again for vasculitis/cryoglobulinemia (previous work-up was non-specific), as well as CRP    Heart failure (HCC)  - Complaining of recent orthopnea and nocturnal cough  - Increase Lasix to 40 mg, daily for next few days and monitor for improvement  - Continue Metoprolol, Lisinopril, and Spironolactone    Varicose veins of both lower extremities with pain  - Chronic issue for patient, thought to be PAD initially but work-up was negative  - Will refer to Dermatology for this as etiology is unclear    Dermoid cyst of arm, left  - Will refer to Dermatology      Return in about 6 weeks (around 6/5/2023).    Signed by: Almas Abebe M.D.

## 2023-04-24 NOTE — ASSESSMENT & PLAN NOTE
- Complaining of recent orthopnea and nocturnal cough  - Increase Lasix to 40 mg, daily for next few days and monitor for improvement  - Continue Metoprolol, Lisinopril, and Spironolactone

## 2023-05-18 RX ORDER — ALBUTEROL SULFATE 90 UG/1
AEROSOL, METERED RESPIRATORY (INHALATION)
Qty: 6.7 EACH | Refills: 1 | Status: SHIPPED | OUTPATIENT
Start: 2023-05-18

## 2023-05-24 RX ORDER — SPIRONOLACTONE 25 MG/1
25 TABLET ORAL DAILY
Qty: 90 TABLET | Refills: 1 | Status: SHIPPED | OUTPATIENT
Start: 2023-05-24 | End: 2023-11-20 | Stop reason: SDUPTHER

## 2023-06-05 ENCOUNTER — OFFICE VISIT (OUTPATIENT)
Dept: INTERNAL MEDICINE | Facility: OTHER | Age: 68
End: 2023-06-05
Payer: COMMERCIAL

## 2023-06-05 VITALS
TEMPERATURE: 98.4 F | HEART RATE: 78 BPM | HEIGHT: 69 IN | BODY MASS INDEX: 40.23 KG/M2 | OXYGEN SATURATION: 91 % | WEIGHT: 271.6 LBS | DIASTOLIC BLOOD PRESSURE: 62 MMHG | SYSTOLIC BLOOD PRESSURE: 131 MMHG

## 2023-06-05 DIAGNOSIS — I73.9 CLAUDICATION OF BOTH LOWER EXTREMITIES (HCC): ICD-10-CM

## 2023-06-05 DIAGNOSIS — Z23 ENCOUNTER FOR IMMUNIZATION: ICD-10-CM

## 2023-06-05 DIAGNOSIS — M1A.0790 CHRONIC GOUT OF FOOT, UNSPECIFIED CAUSE, UNSPECIFIED LATERALITY: ICD-10-CM

## 2023-06-05 DIAGNOSIS — Z00.00 ROUTINE HEALTH MAINTENANCE: ICD-10-CM

## 2023-06-05 DIAGNOSIS — J44.9 CHRONIC OBSTRUCTIVE PULMONARY DISEASE, UNSPECIFIED COPD TYPE (HCC): ICD-10-CM

## 2023-06-05 DIAGNOSIS — G62.9 NEUROPATHY: ICD-10-CM

## 2023-06-05 DIAGNOSIS — R74.8 ELEVATED LIVER ENZYMES: ICD-10-CM

## 2023-06-05 DIAGNOSIS — R76.8 HEPATITIS C ANTIBODY TEST POSITIVE: ICD-10-CM

## 2023-06-05 PROBLEM — M54.50 CHRONIC MIDLINE LOW BACK PAIN WITHOUT SCIATICA: Status: RESOLVED | Noted: 2022-06-06 | Resolved: 2023-06-05

## 2023-06-05 PROBLEM — G89.29 CHRONIC MIDLINE LOW BACK PAIN WITHOUT SCIATICA: Status: RESOLVED | Noted: 2022-06-06 | Resolved: 2023-06-05

## 2023-06-05 PROCEDURE — 3078F DIAST BP <80 MM HG: CPT | Performed by: STUDENT IN AN ORGANIZED HEALTH CARE EDUCATION/TRAINING PROGRAM

## 2023-06-05 PROCEDURE — 90472 IMMUNIZATION ADMIN EACH ADD: CPT | Performed by: STUDENT IN AN ORGANIZED HEALTH CARE EDUCATION/TRAINING PROGRAM

## 2023-06-05 PROCEDURE — 90677 PCV20 VACCINE IM: CPT | Performed by: STUDENT IN AN ORGANIZED HEALTH CARE EDUCATION/TRAINING PROGRAM

## 2023-06-05 PROCEDURE — 99214 OFFICE O/P EST MOD 30 MIN: CPT | Mod: 25,GC | Performed by: STUDENT IN AN ORGANIZED HEALTH CARE EDUCATION/TRAINING PROGRAM

## 2023-06-05 PROCEDURE — 3075F SYST BP GE 130 - 139MM HG: CPT | Performed by: STUDENT IN AN ORGANIZED HEALTH CARE EDUCATION/TRAINING PROGRAM

## 2023-06-05 PROCEDURE — 90471 IMMUNIZATION ADMIN: CPT | Performed by: STUDENT IN AN ORGANIZED HEALTH CARE EDUCATION/TRAINING PROGRAM

## 2023-06-05 PROCEDURE — 90715 TDAP VACCINE 7 YRS/> IM: CPT | Performed by: STUDENT IN AN ORGANIZED HEALTH CARE EDUCATION/TRAINING PROGRAM

## 2023-06-05 RX ORDER — ALLOPURINOL 100 MG/1
100 TABLET ORAL DAILY
Qty: 90 TABLET | Refills: 1 | Status: SHIPPED | OUTPATIENT
Start: 2023-06-05

## 2023-06-05 RX ORDER — BUDESONIDE AND FORMOTEROL FUMARATE DIHYDRATE 80; 4.5 UG/1; UG/1
2 AEROSOL RESPIRATORY (INHALATION) 2 TIMES DAILY
Qty: 10.2 G | Refills: 5 | Status: SHIPPED | OUTPATIENT
Start: 2023-06-05

## 2023-06-05 RX ORDER — GABAPENTIN 300 MG/1
300 CAPSULE ORAL NIGHTLY
Qty: 60 CAPSULE | Refills: 1 | Status: SHIPPED | OUTPATIENT
Start: 2023-06-05

## 2023-06-05 ASSESSMENT — ENCOUNTER SYMPTOMS
VOMITING: 0
WEAKNESS: 0
NERVOUS/ANXIOUS: 0
HEADACHES: 0
FEVER: 0
CHILLS: 0
COUGH: 1
BACK PAIN: 1
DIARRHEA: 0
NAUSEA: 0
ABDOMINAL PAIN: 0
ORTHOPNEA: 1
DEPRESSION: 0
CONSTIPATION: 0
PALPITATIONS: 0
MYALGIAS: 0
SHORTNESS OF BREATH: 1
BLURRED VISION: 0
DOUBLE VISION: 0

## 2023-06-05 NOTE — PROGRESS NOTES
Established Patient    Pee Antoine is a 67 y.o. male who presents today with the following:    CC: Lab follow-up    HPI:      Claudication of bilateral lower extremities - initially though to be PAD but work-up was negative (LUDWIN/ultrasound of BLE were normal). Patient was thus referred to Vascular medicine and Cardiology to evaluate the etiology of his complaint. We also evaluated for cyroglobulinemia and CRP - however, lab results have not yet been sent.        COPD - currently on Albuterol PRN. Has been having exacerbated cough since last visit. No improvement with increased Lasix dosing. Not on Symbicort (unclear why or if it fell of his meds) but has been using Albuterol often.         Healthcare maintenance - overdue for shingles, Tdap, Pneumococcal, as well as COVID booster. He is also overdue for PFTs and AAA screening.       Review of Systems   Constitutional:  Negative for chills and fever.   HENT:  Negative for hearing loss and tinnitus.    Eyes:  Negative for blurred vision and double vision.   Respiratory:  Positive for cough (chronic) and shortness of breath (chronic).    Cardiovascular:  Positive for orthopnea and leg swelling. Negative for chest pain and palpitations.   Gastrointestinal:  Negative for abdominal pain, constipation, diarrhea, nausea and vomiting.   Genitourinary:  Negative for dysuria and urgency.   Musculoskeletal:  Positive for back pain (chronic). Negative for joint pain and myalgias.   Neurological:  Negative for weakness and headaches.   Psychiatric/Behavioral:  Negative for depression. The patient is not nervous/anxious.        Patient Active Problem List    Diagnosis Date Noted    Routine health maintenance 06/05/2023    Varicose veins of both lower extremities with pain 04/24/2023    Dermoid cyst of arm, left 04/24/2023    Morbid obesity with BMI of 40.0-44.9, adult ([facility]) 01/10/2023    Personal history of nicotine dependence  10/05/2022    Gastroesophageal reflux disease  without esophagitis 11/10/2021    Abnormal stress test 10/06/2021    Claudication of both lower extremities (HCC) 10/06/2021    Stage 3 chronic kidney disease (HCC) 02/12/2021    Raynauds syndrome 03/17/2020    Hx of hepatitis C 02/10/2020    Obstructive sleep apnea syndrome in adult 01/28/2020    Dyslipidemia 01/15/2020    Heart failure (HCC) 01/14/2020    Chronic obstructive pulmonary disease (HCC) 07/16/2019    Sciatica 07/16/2019    Gout, chronic 03/18/2013       Social History     Tobacco Use    Smoking status: Every Day     Packs/day: 0.25     Years: 50.00     Pack years: 12.50     Types: Cigarettes     Start date: 1972    Smokeless tobacco: Never    Tobacco comments:     Trying to quit 5-10 per day    Vaping Use    Vaping Use: Never used   Substance Use Topics    Alcohol use: Not Currently     Comment: occassional    Drug use: No       Current Outpatient Medications   Medication Sig Dispense Refill    budesonide-formoterol (SYMBICORT) 80-4.5 MCG/ACT Aerosol Inhale 2 Puffs 2 times a day. 10.2 g 5    allopurinol (ZYLOPRIM) 100 MG Tab Take 1 Tablet by mouth every day. 90 Tablet 1    gabapentin (NEURONTIN) 300 MG Cap Take 1 Capsule by mouth every evening. 60 Capsule 1    spironolactone (ALDACTONE) 25 MG Tab TAKE 1 TABLET BY MOUTH EVERY DAY 90 Tablet 1    albuterol 108 (90 Base) MCG/ACT Aero Soln inhalation aerosol INHALE 1 TO 2 PUFFS BY MOUTH EVERY FOUR HOURS AS NEEDED FOR SHORTNESS OF BREATH. 6.7 Each 1    furosemide (LASIX) 20 MG Tab Take 1 Tablet by mouth every day. 90 Tablet 1    omeprazole (PRILOSEC) 20 MG delayed-release capsule TAKE 1 CAPSULE BY MOUTH EVERY DAY 90 Capsule 1    atorvastatin (LIPITOR) 80 MG tablet Take 1 Tablet by mouth every day. 90 Tablet 1    metoprolol tartrate (LOPRESSOR) 25 MG Tab TAKE 1 TABLET BY MOUTH TWICE A  Tablet 1    nicotine (NICODERM) 14 MG/24HR PATCH 24 HR Place 1 Patch on the skin every 24 hours. 28 Patch 5    lisinopril (PRINIVIL) 2.5 MG Tab Take 2.5 mg by mouth  "every day.      aspirin EC (ECOTRIN) 81 MG TBEC Take 1 Tab by mouth every day. 30 Tab 6    tiotropium (SPIRIVA) 18 MCG CAPS Inhale 1 Cap by mouth every day. (Patient not taking: Reported on 6/5/2023) 30 Cap 3     No current facility-administered medications for this visit.       /62 (BP Location: Right arm, Patient Position: Sitting, BP Cuff Size: Adult)   Pulse 78   Temp 36.9 °C (98.4 °F) (Temporal)   Ht 1.753 m (5' 9\")   Wt 123 kg (271 lb 9.6 oz)   SpO2 91%   BMI 40.11 kg/m²     PHYSICAL EXAM:  Physical Exam  Constitutional:       General: He is not in acute distress.     Appearance: Normal appearance. He is obese. He is not ill-appearing.      Comments: Uses cane for ambulation   HENT:      Head: Normocephalic and atraumatic.      Mouth/Throat:      Mouth: Mucous membranes are moist.   Eyes:      Extraocular Movements: Extraocular movements intact.      Conjunctiva/sclera: Conjunctivae normal.   Cardiovascular:      Rate and Rhythm: Normal rate and regular rhythm.      Heart sounds: Normal heart sounds. No murmur heard.     No friction rub. No gallop.      Comments: Palpable but diminished pulses 1+ present in dorsalis and tibialis posterior arteries.  Pulmonary:      Effort: Pulmonary effort is normal. No respiratory distress.      Breath sounds: No wheezing or rales.      Comments: Mildly diminished breath sounds diffusely  Abdominal:      General: Bowel sounds are normal. There is no distension.      Palpations: Abdomen is soft.      Tenderness: There is no abdominal tenderness.   Musculoskeletal:         General: No swelling.      Right lower leg: No edema.      Left lower leg: No edema.   Skin:     Findings: No lesion (dermoid cyst present on L forearm). Erythema: skin discoloration of BL hands, slowly protrudring up forearms, R hand worse than left.     Comments: Veins visible on BLEs.  Blanching purpura present in both upper and lower extremities bilaterally.    Neurological:      General: No " focal deficit present.      Mental Status: He is alert and oriented to person, place, and time. Mental status is at baseline.      Motor: No weakness.   Psychiatric:         Mood and Affect: Mood normal.         Behavior: Behavior normal.         Thought Content: Thought content normal.         Judgment: Judgment normal.       Assessment and Plan  Claudication of both lower extremities (HCC)  - Still persistent issue - notes he had improvement while on Gabapentin (tried his sister's)    - Will trial 300 mg (nightly)  - Waiting on lab results - will discuss with patient once sent  - Asked patient to follow-up with Cardiology and Vascular medicine    Chronic obstructive pulmonary disease (HCC)  - Having worsening cough, currently on Albuterol PRN  - Added Symbicort  - Will also order PFTs to confirm COPD diagnosis    Routine health maintenance  - Pneumococcal vaccine and Tdap done today  - Pt to get Shingles and COVID booster at pharmacy  - Will also need to follow-up for PFTs and AAA screening - both ordered      Return in about 2 months (around 8/5/2023).    Signed by: Almas Abebe M.D.

## 2023-06-05 NOTE — ASSESSMENT & PLAN NOTE
- Pneumococcal vaccine and Tdap done today  - Pt to get Shingles and COVID booster at pharmacy  - Will also need to follow-up for PFTs and AAA screening - both ordered

## 2023-06-05 NOTE — ASSESSMENT & PLAN NOTE
- Having worsening cough, currently on Albuterol PRN  - Added Symbicort  - Will also order PFTs to confirm COPD diagnosis

## 2023-06-05 NOTE — ASSESSMENT & PLAN NOTE
- Still persistent issue - notes he had improvement while on Gabapentin (tried his sister's)    - Will trial 300 mg (nightly)  - Waiting on lab results - will discuss with patient once sent  - Asked patient to follow-up with Cardiology and Vascular medicine

## 2023-07-03 DIAGNOSIS — I73.9 CLAUDICATION OF BOTH LOWER EXTREMITIES (HCC): ICD-10-CM

## 2023-08-31 RX ORDER — ATORVASTATIN CALCIUM 80 MG/1
80 TABLET, FILM COATED ORAL DAILY
Qty: 90 TABLET | Refills: 0 | Status: SHIPPED | OUTPATIENT
Start: 2023-08-31 | End: 2023-09-12 | Stop reason: SDUPTHER

## 2023-09-13 RX ORDER — ATORVASTATIN CALCIUM 80 MG/1
80 TABLET, FILM COATED ORAL DAILY
Qty: 90 TABLET | Refills: 0 | Status: SHIPPED | OUTPATIENT
Start: 2023-09-13

## 2023-11-20 NOTE — TELEPHONE ENCOUNTER
Received request via: Pharmacy    Was the patient seen in the last year in this department? Yes    Does the patient have an active prescription (recently filled or refills available) for medication(s) requested?  yes    Does the patient have long-term Plus and need 100 day supply (blood pressure, diabetes and cholesterol meds only)? Patient does not have SCP

## 2023-11-21 RX ORDER — SPIRONOLACTONE 25 MG/1
25 TABLET ORAL DAILY
Qty: 90 TABLET | Refills: 1 | Status: SHIPPED | OUTPATIENT
Start: 2023-11-21

## 2023-12-07 DIAGNOSIS — G62.9 NEUROPATHY: ICD-10-CM

## 2023-12-07 DIAGNOSIS — M1A.0790 CHRONIC GOUT OF FOOT, UNSPECIFIED CAUSE, UNSPECIFIED LATERALITY: ICD-10-CM

## 2023-12-07 DIAGNOSIS — R74.8 ELEVATED LIVER ENZYMES: ICD-10-CM

## 2023-12-07 DIAGNOSIS — R76.8 HEPATITIS C ANTIBODY TEST POSITIVE: ICD-10-CM

## 2023-12-07 RX ORDER — ALLOPURINOL 100 MG/1
100 TABLET ORAL DAILY
Qty: 90 TABLET | Refills: 1 | OUTPATIENT
Start: 2023-12-07

## 2023-12-13 NOTE — TELEPHONE ENCOUNTER
Phone Number Called: [phone]    Call outcome: Left a message for patient to schedule re-establish appointment